# Patient Record
Sex: MALE | Race: BLACK OR AFRICAN AMERICAN | Employment: UNEMPLOYED | URBAN - METROPOLITAN AREA
[De-identification: names, ages, dates, MRNs, and addresses within clinical notes are randomized per-mention and may not be internally consistent; named-entity substitution may affect disease eponyms.]

---

## 2017-09-30 ENCOUNTER — HOSPITAL ENCOUNTER (EMERGENCY)
Age: 37
Discharge: HOME | End: 2017-09-30
Attending: EMERGENCY MEDICINE | Admitting: EMERGENCY MEDICINE
Payer: SELF-PAY

## 2017-09-30 VITALS — WEIGHT: 245 LBS | HEART RATE: 73 BPM | OXYGEN SATURATION: 99 % | RESPIRATION RATE: 16 BRPM | TEMPERATURE: 98.1 F

## 2017-09-30 DIAGNOSIS — J06.9 ACUTE UPPER RESPIRATORY INFECTION: ICD-10-CM

## 2017-09-30 DIAGNOSIS — J02.9 ACUTE PHARYNGITIS, UNSPECIFIED ETIOLOGY: Primary | ICD-10-CM

## 2017-09-30 PROCEDURE — 87081 CULTURE SCREEN ONLY: CPT | Performed by: EMERGENCY MEDICINE

## 2017-09-30 PROCEDURE — 99281 EMR DPT VST MAYX REQ PHY/QHP: CPT

## 2017-09-30 NOTE — ED PROVIDER NOTES
[facility] EMERGENCY DEPT      43 y.o. male with noted past medical history who presents to the emergency department c/o cold-like sx and sore throat x4 days. Pain is worsened by swallowing and is rated 8/10. Associated sx include ear pain and cough. No other complaints. Nursing nurses regarding the HPI and triage nursing notes were reviewed. No current facility-administered medications for this encounter. No current outpatient prescriptions on file. No past medical history on file. No past surgical history on file. No family history on file. Social History     Social History    Marital status: SINGLE     Spouse name: N/A    Number of children: N/A    Years of education: N/A     Occupational History    Not on file. Social History Main Topics    Smoking status: Not on file    Smokeless tobacco: Not on file    Alcohol use Not on file    Drug use: Not on file    Sexual activity: Not on file     Other Topics Concern    Not on file     Social History Narrative       No Known Allergies    Patient's primary care provider (as noted in EPIC):  None    REVIEW OF SYSTEMS:    Constitutional:  Negative for diaphoresis. Eyes:  Negative for diploplia. HENT:  Negative for congestion. Respiratory:  Negative for stridor. Cardiovascular:  Negative for palpitations. Gastrointestinal:  Negative for diarrhea. Genitourinary:  Negative for flank pain. Musculoskeletal:  Negative for back pain. Skin:  Negative for pallor. Neurological:  Negative for weakness. Psychiatric:  Negative for hallucinations. Visit Vitals    Pulse 73    Temp 98.1 °F (36.7 °C)    Resp 16    Wt 111.1 kg (245 lb)    SpO2 99%       PHYSICAL EXAM:    CONSTITUTIONAL:  Alert, in no apparent distress;  well developed;  well nourished. HEAD:  Normocephalic, atraumatic. Sinuses:  No sinus pressure with leaning head forward. No sinus tenderness to percussion. EYES:  EOMI. Non-icteric sclera. Normal conjunctiva. ENTM:  Nose:  no rhinorrhea. Throat:  No tonsillar erythema and exudates, mucous membranes moist.    NECK:  No JVD. Supple  RESPIRATORY:  Chest clear, equal breath sounds, good air movement. CARDIOVASCULAR:  Regular rate and rhythm. No murmurs, rubs, or gallops. GI:  Normal bowel sounds, abdomen soft and non-tender. No rebound or guarding. BACK:  Non-tender. UPPER EXT:  Normal inspection. LOWER EXT:  No edema, no calf tenderness. Distal pulses intact. NEURO:  Moves all four extremities, and grossly normal motor exam.  SKIN:  No rashes;  Normal for age. PSYCH:  Alert and normal affect. DIFFERENTIAL DIAGNOSES/ MEDICAL DECISION MAKING:  Viral pharyngitis, streptococcal pharyngitis, peritonsillar or other oropharyngeal abscesses, result of post nasal drip, hand-foot-mouth disease, aphthous ulcers in oropharynx, and/or a combination of the above and lesser etiologies. Abnormal lab results from this emergency department encounter:  Labs Reviewed   [address]       Lab values for this patient within approximately the last 12 hours:  Recent Results (from the past 12 hour(s))   STREP THROAT SCREEN    Collection Time: 09/30/17  3:54 PM   Result Value Ref Range    Special Requests: NO SPECIAL REQUESTS      Strep Screen NEGATIVE       Culture result: PENDING        Radiologist and cardiologist interpretations if available at time of this note:  No results found. Medication(s) ordered for patient during this emergency visit encounter:  Medications - No data to display    ED COURSE:      ED Course:  Given this, as well as the patients presentation, I believe that the patient has acute VIRAL tonsillopharyngitis. MDM:  There is no airway compromise. No stridor. Patient is safe for discharge home. DIAGNOSIS:  1. Acute pharyngitis. 2.  Acute upper respiratory infection. SPECIFIC PATIENT INSTRUCTIONS FROM THE PHYSICIAN WHO TREATED YOU IN THE ER TODAY:  1.   Return if any concerns or worsening of condition(s). 2.  Use over the counter Chloraseptic and throat lozenges, such as Abbasi's throat lozenges, to help control the throat pain. 3.  Follow up with your primary doctor if not improving in the next 2-3 days. Jelena Gill M.D. Provider Attestation:  If a scribe was utilized in generation of this patient record, I personally performed the services described in the documentation, reviewed the documentation, as recorded by the scribe in my presence, and it accurately records the patient's history of presenting illness, review of systems, patient physical examination, and procedures performed by me as the attending physician. Jelena Gill M.D. [facility] Board Certified Emergency Physician  9/30/2017.  3:55 PM    Scribe Attestation      Thalia Walker acting as a scribe for and in the presence of Bianca Poe MD      September 30, 2017 at 4:18 PM       Provider Attestation:      I personally performed the services described in the documentation, reviewed the documentation, as recorded by the scribe in my presence, and it accurately and completely records my words and actions.  September 30, 2017 at 4:18 PM - Bianca Poe MD

## 2017-09-30 NOTE — DISCHARGE INSTRUCTIONS
SPECIFIC PATIENT INSTRUCTIONS FROM THE PHYSICIAN WHO TREATED YOU IN THE ER TODAY:  1. Return if any concerns or worsening of condition(s). 2.  Use over the counter Chloraseptic and throat lozenges, such as Abbasi's throat lozenges, to help control the throat pain. 3.  Follow up with your primary doctor if not improving in the next 2-3 days. Upper Respiratory Infection (Cold): Care Instructions  Your Care Instructions    An upper respiratory infection, or URI, is an infection of the nose, sinuses, or throat. URIs are spread by coughs, sneezes, and direct contact. The common cold is the most frequent kind of URI. The flu and sinus infections are other kinds of URIs. Almost all URIs are caused by viruses. Antibiotics won't cure them. But you can treat most infections with home care. This may include drinking lots of fluids and taking over-the-counter pain medicine. You will probably feel better in 4 to 10 days. The doctor has checked you carefully, but problems can develop later. If you notice any problems or new symptoms, get medical treatment right away. Follow-up care is a key part of your treatment and safety. Be sure to make and go to all appointments, and call your doctor if you are having problems. It's also a good idea to know your test results and keep a list of the medicines you take. How can you care for yourself at home? · To prevent dehydration, drink plenty of fluids, enough so that your urine is light yellow or clear like water. Choose water and other caffeine-free clear liquids until you feel better. If you have kidney, heart, or liver disease and have to limit fluids, talk with your doctor before you increase the amount of fluids you drink. · Take an over-the-counter pain medicine, such as acetaminophen (Tylenol), ibuprofen (Advil, Motrin), or naproxen (Aleve). Read and follow all instructions on the label. · Before you use cough and cold medicines, check the label.  These medicines may not be safe for young children or for people with certain health problems. · Be careful when taking over-the-counter cold or flu medicines and Tylenol at the same time. Many of these medicines have acetaminophen, which is Tylenol. Read the labels to make sure that you are not taking more than the recommended dose. Too much acetaminophen (Tylenol) can be harmful. · Get plenty of rest.  · Do not smoke or allow others to smoke around you. If you need help quitting, talk to your doctor about stop-smoking programs and medicines. These can increase your chances of quitting for good. When should you call for help? Call 911 anytime you think you may need emergency care. For example, call if:  · You have severe trouble breathing. Call your doctor now or seek immediate medical care if:  · You seem to be getting much sicker. · You have new or worse trouble breathing. · You have a new or higher fever. · You have a new rash. Watch closely for changes in your health, and be sure to contact your doctor if:  · You have a new symptom, such as a sore throat, an earache, or sinus pain. · You cough more deeply or more often, especially if you notice more mucus or a change in the color of your mucus. · You do not get better as expected. Where can you learn more? Go to http://vj-peng.info/. Enter R889 in the search box to learn more about \"Upper Respiratory Infection (Cold): Care Instructions. \"  Current as of: March 25, 2017  Content Version: 11.3  © 1630-2181 [employer]. Care instructions adapted under license by [employer] (which disclaims liability or warranty for this information). If you have questions about a medical condition or this instruction, always ask your healthcare professional. [place] [address] any warranty or liability for your use of this information.            Sore Throat: Care Instructions  Your Care Instructions    Infection by bacteria or a virus causes most sore throats. Cigarette smoke, dry air, air pollution, allergies, and yelling can also cause a sore throat. Sore throats can be painful and annoying. Fortunately, most sore throats go away on their own. If you have a bacterial infection, your doctor may prescribe antibiotics. Follow-up care is a key part of your treatment and safety. Be sure to make and go to all appointments, and call your doctor if you are having problems. It's also a good idea to know your test results and keep a list of the medicines you take. How can you care for yourself at home? · If your doctor prescribed antibiotics, take them as directed. Do not stop taking them just because you feel better. You need to take the full course of antibiotics. · Gargle with warm salt water once an hour to help reduce swelling and relieve discomfort. Use 1 teaspoon of salt mixed in 1 cup of warm water. · Take an over-the-counter pain medicine, such as acetaminophen (Tylenol), ibuprofen (Advil, Motrin), or naproxen (Aleve). Read and follow all instructions on the label. · Be careful when taking over-the-counter cold or flu medicines and Tylenol at the same time. Many of these medicines have acetaminophen, which is Tylenol. Read the labels to make sure that you are not taking more than the recommended dose. Too much acetaminophen (Tylenol) can be harmful. · Drink plenty of fluids. Fluids may help soothe an irritated throat. Hot fluids, such as tea or soup, may help decrease throat pain. · Use over-the-counter throat lozenges to soothe pain. Regular cough drops or hard candy may also help. These should not be given to young children because of the risk of choking. · Do not smoke or allow others to smoke around you. If you need help quitting, talk to your doctor about stop-smoking programs and medicines. These can increase your chances of quitting for good. · Use a vaporizer or humidifier to add moisture to your bedroom. Follow the directions for cleaning the machine. When should you call for help? Call your doctor now or seek immediate medical care if:  · You have new or worse trouble swallowing. · Your sore throat gets much worse on one side. Watch closely for changes in your health, and be sure to contact your doctor if you do not get better as expected. Where can you learn more? Go to http://vj-peng.info/. Enter 062 [id number] in the search box to learn more about \"Sore Throat: Care Instructions. \"  Current as of: July 29, 2016  Content Version: 11.3  © 2906-7806 [employer]. Care instructions adapted under license by [employer] (which disclaims liability or warranty for this information). If you have questions about a medical condition or this instruction, always ask your healthcare professional. [address] any warranty or liability for your use of this information. Rapid Strep Test: About This Test  What is it? A rapid strep test checks the bacteria in your throat to see if strep is the cause of your sore throat. Why is this test done? It may be done so your doctor can find out right away whether you have strep throat. There is another test for strep, called a throat culture, but that test takes a few days to get the results. How can you prepare for the test?  You don't need to do anything before you have this test.  What happens during the test?  · You will be asked to tilt your head back and open your mouth as wide as possible. · Your doctor will press your tongue down with a flat stick (tongue depressor) and then examine your mouth and throat. · A clean cotton swab will be rubbed over the back of your throat, around your tonsils, and over any red areas or sores to collect a sample. How long does the test take? · The test takes less than a minute. · Results are available in 10 to 15 minutes.   Follow-up care is a key part of your treatment and safety. Be sure to make and go to all appointments, and call your doctor if you are having problems. It's also a good idea to keep a list of the medicines you take. Ask your doctor when you can expect to have your test results. Where can you learn more? Go to http://vj-peng.info/. Enter B356 in the search box to learn more about \"Rapid Strep Test: About This Test.\"  Current as of: 2016  Content Version: 11.3  © 4400-0767 [employer]. Care instructions adapted under license by [employer] (which disclaims liability or warranty for this information). If you have questions about a medical condition or this instruction, always ask your healthcare professional. [address] any warranty or liability for your use of this information. [facility] Activation    Thank you for requesting access to [facility]. Please follow the instructions below to securely access and download your online medical record. [facility] allows you to send messages to your doctor, view your test results, renew your prescriptions, schedule appointments, and more. How Do I Sign Up? 1. In your internet browser, go to [email or web address]. 2. Click on the First Time User? Click Here link in the Sign In box. You will see the New Member Sign Up page. 3. Enter your [facility] Access Code exactly as it appears below. You will not need to use this code after youve completed the sign-up process. If you do not sign up before the expiration date, you must request a new code. [facility] Access Code: [email or web address]  Expires: 2017  4:22 PM (This is the date your [facility] access code will )    4. Enter the last four digits of your Social Security Number (xxxx) and Date of Birth (mm/dd/yyyy) as indicated and click Submit. You will be taken to the next sign-up page. 5. Create a [facility] ID.  This will be your [facility] login ID and cannot be changed, so think of one that is secure and easy to remember. 6. Create a [employer] password. You can change your password at any time. 7. Enter your Password Reset Question and Answer. This can be used at a later time if you forget your password. 8. Enter your e-mail address. You will receive e-mail notification when new information is available in [address] 9. Click Sign Up. You can now view and download portions of your medical record. 10. Click the Download Summary menu link to download a portable copy of your medical information. Additional Information    If you have questions, please visit the Frequently Asked Questions section of the [employer] website at [email or web address] Remember, [employer] is NOT to be used for urgent needs. For medical emergencies, dial 911.

## 2017-10-02 LAB
B-HEM STREP THROAT QL CULT: NEGATIVE
BACTERIA SPEC CULT: NORMAL
SERVICE CMNT-IMP: NORMAL

## 2020-12-05 ENCOUNTER — APPOINTMENT (OUTPATIENT)
Dept: GENERAL RADIOLOGY | Age: 40
DRG: 137 | End: 2020-12-05
Attending: PHYSICIAN ASSISTANT
Payer: MEDICAID

## 2020-12-05 ENCOUNTER — HOSPITAL ENCOUNTER (INPATIENT)
Age: 40
LOS: 2 days | Discharge: HOME | DRG: 137 | End: 2020-12-07
Attending: EMERGENCY MEDICINE | Admitting: HOSPITALIST
Payer: MEDICAID

## 2020-12-05 DIAGNOSIS — J96.01 ACUTE RESPIRATORY FAILURE WITH HYPOXIA (HCC): ICD-10-CM

## 2020-12-05 DIAGNOSIS — U07.1 PNEUMONIA DUE TO COVID-19 VIRUS: Primary | ICD-10-CM

## 2020-12-05 DIAGNOSIS — J12.82 PNEUMONIA DUE TO COVID-19 VIRUS: Primary | ICD-10-CM

## 2020-12-05 PROBLEM — J96.00 ACUTE RESPIRATORY FAILURE (HCC): Status: ACTIVE | Noted: 2020-12-05

## 2020-12-05 LAB
ALBUMIN SERPL-MCNC: 3.2 G/DL (ref 3.4–5)
ALBUMIN/GLOB SERPL: 0.8 {RATIO} (ref 0.8–1.7)
ALP SERPL-CCNC: 74 U/L (ref 45–117)
ALT SERPL-CCNC: 94 U/L (ref 16–61)
ANION GAP SERPL CALC-SCNC: 6 MMOL/L (ref 3–18)
APPEARANCE UR: CLEAR
APTT PPP: 33.5 SEC (ref 23–36.4)
AST SERPL-CCNC: 111 U/L (ref 10–38)
BACTERIA URNS QL MICRO: ABNORMAL /HPF
BASOPHILS # BLD: 0.1 K/UL (ref 0–0.1)
BASOPHILS NFR BLD: 1 % (ref 0–2)
BILIRUB SERPL-MCNC: 1.6 MG/DL (ref 0.2–1)
BILIRUB UR QL: ABNORMAL
BUN SERPL-MCNC: 14 MG/DL (ref 7–18)
BUN/CREAT SERPL: 11 (ref 12–20)
CALCIUM SERPL-MCNC: 8.7 MG/DL (ref 8.5–10.1)
CHLORIDE SERPL-SCNC: 100 MMOL/L (ref 100–111)
CO2 SERPL-SCNC: 27 MMOL/L (ref 21–32)
COLOR UR: ABNORMAL
COVID-19 RAPID TEST, COVR: NOT DETECTED
CREAT SERPL-MCNC: 1.28 MG/DL (ref 0.6–1.3)
D DIMER PPP FEU-MCNC: 0.88 UG/ML(FEU)
DIFFERENTIAL METHOD BLD: ABNORMAL
EOSINOPHIL # BLD: 0 K/UL (ref 0–0.4)
EOSINOPHIL NFR BLD: 0 % (ref 0–5)
EPITH CASTS URNS QL MICRO: ABNORMAL /LPF (ref 0–5)
ERYTHROCYTE [DISTWIDTH] IN BLOOD BY AUTOMATED COUNT: 11.3 % (ref 11.6–14.5)
FIBRINOGEN PPP-MCNC: 814 MG/DL (ref 210–451)
GLOBULIN SER CALC-MCNC: 3.9 G/DL (ref 2–4)
GLUCOSE SERPL-MCNC: 102 MG/DL (ref 74–99)
GLUCOSE UR STRIP.AUTO-MCNC: NEGATIVE MG/DL
HCT VFR BLD AUTO: 40.1 % (ref 36–48)
HGB BLD-MCNC: 12.8 G/DL (ref 13–16)
HGB UR QL STRIP: NEGATIVE
INR PPP: 1.1 (ref 0.8–1.2)
KETONES UR QL STRIP.AUTO: NEGATIVE MG/DL
L PNEUMO AG UR QL IA: NEGATIVE
LACTATE BLD-SCNC: 0.81 MMOL/L (ref 0.4–2)
LEUKOCYTE ESTERASE UR QL STRIP.AUTO: ABNORMAL
LYMPHOCYTES # BLD: 1.5 K/UL (ref 0.9–3.6)
LYMPHOCYTES NFR BLD: 12 % (ref 21–52)
MCH RBC QN AUTO: 29.2 PG (ref 24–34)
MCHC RBC AUTO-ENTMCNC: 31.9 G/DL (ref 31–37)
MCV RBC AUTO: 91.6 FL (ref 74–97)
MONOCYTES # BLD: 2.6 K/UL (ref 0.05–1.2)
MONOCYTES NFR BLD: 22 % (ref 3–10)
NEUTS SEG # BLD: 8 K/UL (ref 1.8–8)
NEUTS SEG NFR BLD: 65 % (ref 40–73)
NITRITE UR QL STRIP.AUTO: NEGATIVE
PH UR STRIP: 6 [PH] (ref 5–8)
PLATELET # BLD AUTO: 319 K/UL (ref 135–420)
PMV BLD AUTO: 11.8 FL (ref 9.2–11.8)
POTASSIUM SERPL-SCNC: 4.4 MMOL/L (ref 3.5–5.5)
PROT SERPL-MCNC: 7.1 G/DL (ref 6.4–8.2)
PROT UR STRIP-MCNC: ABNORMAL MG/DL
PROTHROMBIN TIME: 14.2 SEC (ref 11.5–15.2)
RBC # BLD AUTO: 4.38 M/UL (ref 4.7–5.5)
RBC #/AREA URNS HPF: ABNORMAL /HPF (ref 0–5)
S PNEUM AG UR QL: NEGATIVE
SODIUM SERPL-SCNC: 133 MMOL/L (ref 136–145)
SOURCE, COVRS: NORMAL
SP GR UR REFRACTOMETRY: 1.02 (ref 1–1.03)
SPECIMEN TYPE, XMCV1T: NORMAL
UROBILINOGEN UR QL STRIP.AUTO: 1 EU/DL (ref 0.2–1)
WBC # BLD AUTO: 12.1 K/UL (ref 4.6–13.2)
WBC URNS QL MICRO: ABNORMAL /HPF (ref 0–4)

## 2020-12-05 PROCEDURE — 85025 COMPLETE CBC W/AUTO DIFF WBC: CPT

## 2020-12-05 PROCEDURE — 99285 EMERGENCY DEPT VISIT HI MDM: CPT

## 2020-12-05 PROCEDURE — 74011250636 HC RX REV CODE- 250/636: Performed by: PHYSICIAN ASSISTANT

## 2020-12-05 PROCEDURE — 96375 TX/PRO/DX INJ NEW DRUG ADDON: CPT

## 2020-12-05 PROCEDURE — 87635 SARS-COV-2 COVID-19 AMP PRB: CPT

## 2020-12-05 PROCEDURE — 93005 ELECTROCARDIOGRAM TRACING: CPT

## 2020-12-05 PROCEDURE — 74011000258 HC RX REV CODE- 258: Performed by: PHYSICIAN ASSISTANT

## 2020-12-05 PROCEDURE — 74011250636 HC RX REV CODE- 250/636: Performed by: INTERNAL MEDICINE

## 2020-12-05 PROCEDURE — 85730 THROMBOPLASTIN TIME PARTIAL: CPT

## 2020-12-05 PROCEDURE — 85379 FIBRIN DEGRADATION QUANT: CPT

## 2020-12-05 PROCEDURE — 83605 ASSAY OF LACTIC ACID: CPT

## 2020-12-05 PROCEDURE — 77010033678 HC OXYGEN DAILY

## 2020-12-05 PROCEDURE — 87070 CULTURE OTHR SPECIMN AEROBIC: CPT

## 2020-12-05 PROCEDURE — 94762 N-INVAS EAR/PLS OXIMTRY CONT: CPT

## 2020-12-05 PROCEDURE — 80053 COMPREHEN METABOLIC PANEL: CPT

## 2020-12-05 PROCEDURE — 81001 URINALYSIS AUTO W/SCOPE: CPT

## 2020-12-05 PROCEDURE — XW033E5 INTRODUCTION OF REMDESIVIR ANTI-INFECTIVE INTO PERIPHERAL VEIN, PERCUTANEOUS APPROACH, NEW TECHNOLOGY GROUP 5: ICD-10-PCS | Performed by: INTERNAL MEDICINE

## 2020-12-05 PROCEDURE — 87449 NOS EACH ORGANISM AG IA: CPT

## 2020-12-05 PROCEDURE — 96372 THER/PROPH/DIAG INJ SC/IM: CPT

## 2020-12-05 PROCEDURE — 96365 THER/PROPH/DIAG IV INF INIT: CPT

## 2020-12-05 PROCEDURE — 74011000250 HC RX REV CODE- 250: Performed by: INTERNAL MEDICINE

## 2020-12-05 PROCEDURE — 71045 X-RAY EXAM CHEST 1 VIEW: CPT

## 2020-12-05 PROCEDURE — 65660000000 HC RM CCU STEPDOWN

## 2020-12-05 PROCEDURE — 85384 FIBRINOGEN ACTIVITY: CPT

## 2020-12-05 PROCEDURE — 74011000258 HC RX REV CODE- 258: Performed by: INTERNAL MEDICINE

## 2020-12-05 PROCEDURE — 74011250637 HC RX REV CODE- 250/637: Performed by: HOSPITALIST

## 2020-12-05 PROCEDURE — 85610 PROTHROMBIN TIME: CPT

## 2020-12-05 PROCEDURE — 87040 BLOOD CULTURE FOR BACTERIA: CPT

## 2020-12-05 RX ORDER — ENOXAPARIN SODIUM 100 MG/ML
40 INJECTION SUBCUTANEOUS EVERY 12 HOURS
Status: DISCONTINUED | OUTPATIENT
Start: 2020-12-05 | End: 2020-12-07

## 2020-12-05 RX ORDER — HEPARIN SODIUM 5000 [USP'U]/ML
5000 INJECTION, SOLUTION INTRAVENOUS; SUBCUTANEOUS EVERY 8 HOURS
Status: CANCELLED | OUTPATIENT
Start: 2020-12-05

## 2020-12-05 RX ORDER — LANOLIN ALCOHOL/MO/W.PET/CERES
9 CREAM (GRAM) TOPICAL
Status: DISCONTINUED | OUTPATIENT
Start: 2020-12-05 | End: 2020-12-07 | Stop reason: HOSPADM

## 2020-12-05 RX ORDER — IPRATROPIUM BROMIDE AND ALBUTEROL SULFATE 2.5; .5 MG/3ML; MG/3ML
3 SOLUTION RESPIRATORY (INHALATION)
Status: DISCONTINUED | OUTPATIENT
Start: 2020-12-05 | End: 2020-12-07 | Stop reason: HOSPADM

## 2020-12-05 RX ORDER — SODIUM CHLORIDE 0.9 % (FLUSH) 0.9 %
5-40 SYRINGE (ML) INJECTION EVERY 8 HOURS
Status: DISCONTINUED | OUTPATIENT
Start: 2020-12-05 | End: 2020-12-07 | Stop reason: HOSPADM

## 2020-12-05 RX ORDER — SODIUM CHLORIDE 0.9 % (FLUSH) 0.9 %
30 SYRINGE (ML) INJECTION DAILY
Status: DISCONTINUED | OUTPATIENT
Start: 2020-12-05 | End: 2020-12-07 | Stop reason: HOSPADM

## 2020-12-05 RX ORDER — SODIUM CHLORIDE 0.9 % (FLUSH) 0.9 %
5-10 SYRINGE (ML) INJECTION AS NEEDED
Status: DISCONTINUED | OUTPATIENT
Start: 2020-12-05 | End: 2020-12-07 | Stop reason: SDUPTHER

## 2020-12-05 RX ORDER — SODIUM CHLORIDE 0.9 % (FLUSH) 0.9 %
5-40 SYRINGE (ML) INJECTION AS NEEDED
Status: DISCONTINUED | OUTPATIENT
Start: 2020-12-05 | End: 2020-12-07 | Stop reason: HOSPADM

## 2020-12-05 RX ORDER — DEXAMETHASONE SODIUM PHOSPHATE 4 MG/ML
6 INJECTION, SOLUTION INTRA-ARTICULAR; INTRALESIONAL; INTRAMUSCULAR; INTRAVENOUS; SOFT TISSUE EVERY 24 HOURS
Status: DISCONTINUED | OUTPATIENT
Start: 2020-12-05 | End: 2020-12-07 | Stop reason: HOSPADM

## 2020-12-05 RX ADMIN — Medication 10 ML: at 22:00

## 2020-12-05 RX ADMIN — AZITHROMYCIN MONOHYDRATE 500 MG: 500 INJECTION, POWDER, LYOPHILIZED, FOR SOLUTION INTRAVENOUS at 13:48

## 2020-12-05 RX ADMIN — DEXAMETHASONE SODIUM PHOSPHATE 6 MG: 4 INJECTION, SOLUTION INTRAMUSCULAR; INTRAVENOUS at 17:46

## 2020-12-05 RX ADMIN — CEFTRIAXONE 2 G: 2 INJECTION, POWDER, FOR SOLUTION INTRAMUSCULAR; INTRAVENOUS at 13:17

## 2020-12-05 RX ADMIN — ENOXAPARIN SODIUM 40 MG: 40 INJECTION SUBCUTANEOUS at 16:33

## 2020-12-05 RX ADMIN — REMDESIVIR 200 MG: 100 INJECTION, POWDER, LYOPHILIZED, FOR SOLUTION INTRAVENOUS at 17:49

## 2020-12-05 RX ADMIN — Medication 9 MG: at 22:28

## 2020-12-05 NOTE — ED PROVIDER NOTES
EMERGENCY DEPARTMENT HISTORY AND PHYSICAL EXAM      Date: 12/5/2020  Patient Name: Sallie Tan    History of Presenting Illness     Chief Complaint   Patient presents with    Shortness of Breath    Cough       History Provided By: Patient    HPI: Sallie Tan, 41 y.o. male no significant PMHx presents ambulatory to the ED with cc of productive cough x 1 week. Pt recently seen at [facility] and dx with COVID. Pt reports worsening SOB over the past few days. Denies hx asthma, COPD, DM. Nonsmoker. Pt has been using inhaler without relief of sx. Denies fever/chills, abd pain, vomiting, diarrhea, chest pain, dizziness. Denies using O2 at home. There are no other complaints, changes, or physical findings at this time. PCP: [facility] [place]    No current facility-administered medications on file prior to encounter. No current outpatient medications on file prior to encounter. Past History     Past Medical History:  No past medical history on file. Past Surgical History:  No past surgical history on file. Family History:  No family history on file. Social History:  Social History     Tobacco Use    Smoking status: Not on file   Substance Use Topics    Alcohol use: Not on file    Drug use: Not on file       Allergies:  No Known Allergies      Review of Systems   Review of Systems   Constitutional: Negative for chills and fever. HENT: Negative for facial swelling. Eyes: Negative for photophobia and visual disturbance. Respiratory: Positive for cough and shortness of breath. Cardiovascular: Negative for chest pain. Gastrointestinal: Negative for abdominal pain, nausea and vomiting. Genitourinary: Negative for flank pain. Skin: Negative for color change, pallor, rash and wound. Neurological: Negative for dizziness, weakness, light-headedness and headaches. All other systems reviewed and are negative. Physical Exam   Physical Exam  Vitals signs and nursing note reviewed. Constitutional:       General: He is not in acute distress. Appearance: He is well-developed. Comments: Pt in NAD   HENT:      Head: Normocephalic and atraumatic. Eyes:      Conjunctiva/sclera: Conjunctivae normal.   Cardiovascular:      Rate and Rhythm: Normal rate and regular rhythm. Heart sounds: Normal heart sounds. Pulmonary:      Effort: Tachypnea present. No respiratory distress. Breath sounds: Normal breath sounds. Comments: Lungs CTA  No intercostal retractions  Abdominal:      General: Bowel sounds are normal.      Palpations: Abdomen is soft. Tenderness: There is no abdominal tenderness. Comments: Abdomen soft, nondistended  No guarding or rigidity   Musculoskeletal: Normal range of motion. Skin:     General: Skin is warm. Findings: No rash. Neurological:      Mental Status: He is alert and oriented to person, place, and time. Cranial Nerves: No cranial nerve deficit. Psychiatric:         Behavior: Behavior normal.         Diagnostic Study Results     Labs -     Recent Results (from the past 12 hour(s))   METABOLIC PANEL, COMPREHENSIVE    Collection Time: 12/05/20 11:25 AM   Result Value Ref Range    Sodium 133 (L) 136 - 145 mmol/L    Potassium 4.4 3.5 - 5.5 mmol/L    Chloride 100 100 - 111 mmol/L    CO2 27 21 - 32 mmol/L    Anion gap 6 3.0 - 18 mmol/L    Glucose 102 (H) 74 - 99 mg/dL    BUN 14 7.0 - 18 MG/DL    Creatinine 1.28 0.6 - 1.3 MG/DL    BUN/Creatinine ratio 11 (L) 12 - 20      GFR est AA >60 >60 ml/min/1.73m2    GFR est non-AA >60 >60 ml/min/1.73m2    Calcium 8.7 8.5 - 10.1 MG/DL    Bilirubin, total 1.6 (H) 0.2 - 1.0 MG/DL    ALT (SGPT) 94 (H) 16 - 61 U/L    AST (SGOT) 111 (H) 10 - 38 U/L    Alk.  phosphatase 74 45 - 117 U/L    Protein, total 7.1 6.4 - 8.2 g/dL    Albumin 3.2 (L) 3.4 - 5.0 g/dL    Globulin 3.9 2.0 - 4.0 g/dL    A-G Ratio 0.8 0.8 - 1.7     CBC WITH AUTOMATED DIFF    Collection Time: 12/05/20 11:25 AM   Result Value Ref Range WBC 12.1 4.6 - 13.2 K/uL    RBC 4.38 (L) 4.70 - 5.50 M/uL    HGB 12.8 (L) 13.0 - 16.0 g/dL    HCT 40.1 36.0 - 48.0 %    MCV 91.6 74.0 - 97.0 FL    MCH 29.2 24.0 - 34.0 PG    MCHC 31.9 31.0 - 37.0 g/dL    RDW 11.3 (L) 11.6 - 14.5 %    PLATELET 246 782 - 268 K/uL    MPV 11.8 9.2 - 11.8 FL    NEUTROPHILS 65 40 - 73 %    LYMPHOCYTES 12 (L) 21 - 52 %    MONOCYTES 22 (H) 3 - 10 %    EOSINOPHILS 0 0 - 5 %    BASOPHILS 1 0 - 2 %    ABS. NEUTROPHILS 8.0 1.8 - 8.0 K/UL    ABS. LYMPHOCYTES 1.5 0.9 - 3.6 K/UL    ABS. MONOCYTES 2.6 (H) 0.05 - 1.2 K/UL    ABS. EOSINOPHILS 0.0 0.0 - 0.4 K/UL    ABS.  BASOPHILS 0.1 0.0 - 0.1 K/UL    DF AUTOMATED     PROTHROMBIN TIME + INR    Collection Time: 12/05/20 11:25 AM   Result Value Ref Range    Prothrombin time 14.2 11.5 - 15.2 sec    INR 1.1 0.8 - 1.2     PTT    Collection Time: 12/05/20 11:25 AM   Result Value Ref Range    aPTT 33.5 23.0 - 36.4 SEC   D DIMER    Collection Time: 12/05/20 11:25 AM   Result Value Ref Range    D DIMER 0.88 (H) <0.46 ug/ml(FEU)   FIBRINOGEN    Collection Time: 12/05/20 11:25 AM   Result Value Ref Range    Fibrinogen 814 (H) 210 - 451 mg/dL   POC LACTIC ACID    Collection Time: 12/05/20 11:32 AM   Result Value Ref Range    Lactic Acid (POC) 0.81 0.40 - 2.00 mmol/L   EKG, 12 LEAD, INITIAL    Collection Time: 12/05/20 11:50 AM   Result Value Ref Range    Ventricular Rate 91 BPM    Atrial Rate 91 BPM    P-R Interval 126 ms    QRS Duration 86 ms    Q-T Interval 370 ms    QTC Calculation (Bezet) 455 ms    Calculated P Axis 55 degrees    Calculated R Axis 33 degrees    Calculated T Axis 17 degrees    Diagnosis       Normal sinus rhythm  Nonspecific T wave abnormality  Abnormal ECG  No previous ECGs available     URINALYSIS W/ RFLX MICROSCOPIC    Collection Time: 12/05/20 12:00 PM   Result Value Ref Range    Color DARK YELLOW      Appearance CLEAR      Specific gravity 1.018 1.005 - 1.030      pH (UA) 6.0 5.0 - 8.0      Protein TRACE (A) NEG mg/dL    Glucose Negative NEG mg/dL    Ketone Negative NEG mg/dL    Bilirubin SMALL (A) NEG      Blood Negative NEG      Urobilinogen 1.0 0.2 - 1.0 EU/dL    Nitrites Negative NEG      Leukocyte Esterase TRACE (A) NEG     URINE MICROSCOPIC ONLY    Collection Time: 12/05/20 12:00 PM   Result Value Ref Range    WBC 2 to 5 0 - 4 /hpf    RBC 0 to 3 0 - 5 /hpf    Epithelial cells FEW 0 - 5 /lpf    Bacteria 1+ (A) NEG /hpf   SARS-COV-2    Collection Time: 12/05/20  1:13 PM   Result Value Ref Range    Specimen source Nasopharyngeal      COVID-19 rapid test Not detected NOTD      Specimen type NP Swab         Radiologic Studies -   XR CHEST PORT   Final Result   IMPRESSION:         1. Bilateral patchy infiltrates. Appearance could be seen with Covid pneumonia. CT Results  (Last 48 hours)    None        CXR Results  (Last 48 hours)               12/05/20 1125  XR CHEST PORT Final result    Impression:  IMPRESSION:           1. Bilateral patchy infiltrates. Appearance could be seen with Covid pneumonia. Narrative:  EXAM: CHEST RADIOGRAPH, SINGLE VIEW       CLINICAL INDICATION/HISTORY: Short of breath. Cough       COMPARISON: None. TECHNIQUE: Portable frontal view of the chest was obtained.        _______________       FINDINGS:       SUPPORT DEVICES: None. HEART AND MEDIASTINUM: Cardiomediastinal silhouette appears within normal   limits. Normal caliber thoracic aorta. No central vascular congestion. LUNGS AND PLEURAL SPACES: Bilateral patchy infiltrates are present. No pleural   effusion or pneumothorax. BONY THORAX AND SOFT TISSUES: No acute osseous abnormality. _______________                 Medical Decision Making   I am the first provider for this patient. I reviewed the vital signs, available nursing notes, past medical history, past surgical history, family history and social history. Vital Signs-Reviewed the patient's vital signs.   Patient Vitals for the past 12 hrs:   Temp Pulse Resp BP SpO2   12/05/20 1500 -- 96 20 122/72 95 %   12/05/20 1400 -- 96 (!) 36 111/79 96 %   12/05/20 1337 -- 96 30 116/74 94 %   12/05/20 1245 -- 89 29 -- 92 %   12/05/20 1230 -- 89 (!) 32 116/74 93 %   12/05/20 1215 -- 86 25 115/68 91 %   12/05/20 1205 -- 98 29 104/78 97 %   12/05/20 1200 -- (!) 116 (!) 34 -- 98 %   12/05/20 1145 -- 92 30 -- 95 %   12/05/20 1130 -- 97 25 -- 93 %   12/05/20 1115 -- 96 21 113/79 94 %   12/05/20 1100 -- 99 (!) 31 115/80 95 %   12/05/20 1036 97.2 °F (36.2 °C) (!) 104 20 (!) 131/94 94 %         EKG interpretation: (Preliminary)  Rhythm: normal sinus rhythm; and regular . Rate (approx.): 91; Axis: normal; IL interval: normal; QRS interval: normal ; ST/T wave: T wave inverted. No acute ischemic changes. Records Reviewed: Nursing Notes and Old Medical Records    Provider Notes (Medical Decision Making):   DDx: COVID, PNA, URI, Sepsis, Respiratory failure    40 yo M who presents with productive cough with worsening SOB over the past week. Recently dx with COVID. Meets sepsis criteria. EKG shows no acute ischemic changes. No leukocytosis. CXR shows possible PNA or COVID. Treated with azithro and rocephin in ED. Pt desat's to 90% while talking, and not on O2 at home. Pt admitted for further management. ED Course:   Initial assessment performed. The patients presenting problems have been discussed, and they are in agreement with the care plan formulated and outlined with them. I have encouraged them to ask questions as they arise throughout their visit. Chart review:   Se[facility] on 11/[facility] at [facility] - positive for COVID      Holding fluids based on possible COVID dx and concern for fluid overload. Pt is in agreement. While talking to pt, SpO2 dropped to 91%. Remained 90% - 91% while talking. Pt tachypnic while talking. Placed on 2L O2 and SpO2 now 97%. Reassessed pt for fluids. Will continue to hold fluids. Pt is in agreement.         Spoke with Dr. Emily Daniel, consult for hospitalist. Dicussed pt's recent COVID dx, oxygen desaturation, CXR and worsening sx. Discussed covered for sepsis and       Disposition:  Admitted    PLAN:  1. There are no discharge medications for this patient. 2.   Follow-up Information    None       Return to ED if worse     Diagnosis     Clinical Impression:   1. Pneumonia due to COVID-19 virus    2. Acute respiratory failure with hypoxia (HCC)        Attestations:    BI Hercules    Please note that this dictation was completed with Conject, the computer voice recognition software. Quite often unanticipated grammatical, syntax, homophones, and other interpretive errors are inadvertently transcribed by the computer software. Please disregard these errors. Please excuse any errors that have escaped final proofreading. Thank you.

## 2020-12-05 NOTE — ED NOTES
Pt reports cough and SOB since Thanksgiving. Pt states he had a positive COVID test on Tuesday.   Pt reports increased SOB especially when talking and walking

## 2020-12-05 NOTE — PROGRESS NOTES
Pharmacy Services - Remdesivir      Completed chart review for [facility]. Patient meets all inclusion criteria for use and zero exclusion criteria for remdesivir per ministry guidelines outlined below. ID consulted and approved. Inclusion (must meet both criteria)  Adults, children (?3.5Kg, only use lyophilized powder), or pregnant women with proven COVID-19 as defined by a positive nasopharyngeal swab, tracheal aspirate or sputum SARS-CoV-2 PCR or a positive serology test requiring hospitalization for COVID-19-severe pneumonia. Patients should not be hospitalized solely with the intent of receiving remdesivir without meeting clinical criteria requiring hospitalization. Requiring supplemental oxygen*      Exclusion Criteria (Patients who would not be eligible for RDV therapy) - Present at initiation of therapy :  Requiring invasive or non-invasive mechanical ventilation**   Consider use in patients requiring high-flow oxygen early in the disease state (based on symptom duration)   Use of more than 1 vasopressor agent prior to start of remdesivir   Already improving on current treatment/supportive regimen as evidenced by improving oxygenation, and/or impending discharge   Patients in whom the clinical team think death is in the immediate short-term whereby administration of RDV unlikely to change clinical outcome       Other considerations:  Prioritize patients who present with symptom onset < 14 days and within 10 days of acute care admission  Special populations:  GFR < 30mL/min (Cockcroft-Gault formula for patients ? 18 years or [address] [place] [address] for patients < 25years of age) : Use with caution due to risk of cyclodextrin toxicity with IV solution as it accumulates in reduced renal clearance   If therapy is stopped and later restarted, there is no need to reload patient, dosing should continue with dose where it was stopped to complete the 5-day course.    If a patient has clinically improved and is ready for discharge, completion of remdesivir course should not be the sole reason for continued admission     Dosing Regimen and Duration of Therapy:  200mg on day 1, followed by 100mg daily x 4 days (5 days is max treatment duration) =  (6 vials)  IV infusion over 30 minutes  0.9% Sodium Chloride 30mL IV flush daily to follow each remdesivir dose x 5 days    Monitoring Parameters:   CMP daily x 5 days   Stop remdesivir if LFTs substantially increase following initiation of remdesivir (ie: 5x baseline lab values at admission or most recent available LFT lab values)   CBC daily x 5 days        Please contact pharmacy directly with any questions. Thank you. Dexamethasone and Enoxaparin ordered per protocol, the later pending labs.       [facility] [address]  Clinical Pharmacist  [phone]

## 2020-12-05 NOTE — H&P
Internal Medicine History and Physical  Note           NAME:  Shawnie Lombard   :   1980   MRN:  [id number]     PCP:  Renea Ellington     Date/Time:  2020 2:43 PM      I hereby certify this patient for admission based upon medical necessity as noted below:        Assessment / plan :        Principal Problem:    Pneumonia due to COVID-19 virus (2020)    Active Problems:    Acute respiratory failure ([address] (2020)         # COVID  Pneumonia. Iv Antibiotic zithro and rocephin. Urine Ag test for pneumonia. Maintain o2. Remidisivr  , Decadron per protocol , pharmacy consulted. Conv plasma not ordered , pt tested negative for COVID and repeat in process. ID consulted. Lovenox BID    # Acute respiratory failure with Hypoxia . Maintain O2   Currently on 2 LNC    #Obesity  Body mass index is 36.26 kg/m². # Continue home regimen / Medications when appropriate. -DVT prophylaxis : Lovenox   - Code Status : FULL    -Other chronic medical problems as per past history. Further management depend on the course of the case and expanded data base. DISPO - pt to be admitted at this time for reasons addressed above, continued hospitalization for ongoing assessment and treatment indicated        Subjective:     CHIEF COMPLAINT:  SOB and cough since thanks giving     HISTORY OF PRESENT ILLNESS:     Mr. Denia Gray is a 41 y.o.  male who is admitted for COVID pneumonia . Mr. Denia Gray with past medical history as noted below , presented to the Emergency Department today  complaining of above. Triage and ER notes noted. ER MD wanted to admit the patient to the hospital for further management and called hospitalist to facilitate this process. patient tested positive for COVID at [facility] 3 days ago , presented with worsening SOB, cough with mucous phlegm production. He has no DM , not a smoker and no known immuno comprised condition.  At our ER, he tested COVID test negative , rapid one. CXR reported with bilateral infiltrate. Patient was coughing and bringing phlegm , whitish mucous when I asked him to take deep breath. No past medical history on file. No past surgical history on file. Social History     Tobacco Use    Smoking status: Not on file   Substance Use Topics    Alcohol use: Not on file        No family history on file.      No Known Allergies     Prior to Admission medications    Not on File       Review of Systems:  (bold if positive, otherwise negative), apart from what mentioned in HPI  Apart from above patient deny any other significant complain  Gen:  Weight gain, weight loss, fever, chills, fatigue  Eyes:  Visual changes, pain, conjunctivitis  ENT:  Sore throat, rhinorrhea, decreased hearing  CVS:  Palpitations, chest pain, dizziness, syncope, edema, PND  Pulm:  Cough, dyspnea, sputum, hemoptysis, wheezing  GI:  Abdominal pain, nausea, emesis, diarrhea, constipation, GERD, melena  :  Hematuria, incontinence, nocturia, dysuria, discharge  MS:  Pain, weakness, swelling, arthritis  Skin:  Rash, erythema, abscess, wound, moles  Endo:  Heat intolerance, cold intolerance, weight gain, polyuria  Hem:  Enlarged nodes, bruising, bleeding, night sweats  Renal:  Edema, change in urine, flank pain  Neuro:  Numbness, tingling, weakness, seizure, headache, tremors       VITALS:    Vital signs reviewed; most recent are:    Visit Vitals  /79   Pulse 96   Temp 97.2 °F (36.2 °C)   Resp (!) 36   Ht 5' 11\" (1.803 m)   Wt 117.9 kg (260 lb)   SpO2 96%   BMI 36.26 kg/m²     SpO2 Readings from Last 6 Encounters:   12/05/20 96%   09/30/17 99%            Intake/Output Summary (Last 24 hours) at 12/5/2020 1521  Last data filed at 12/5/2020 1347  Gross per 24 hour   Intake 50 ml   Output --   Net 50 ml        Physical Exam:     Gen:  Appear stated age, Well-developed,  in no acute distress BUT COUGHING   HEENT:  Head atraumatic, normocephalic , hearing intact to voice, moist mucous membranes. Neck:  Trachea midline , No apparent JVD, Supple,  thyroid non-tender  Resp:  No accessory muscle use,Bilateral BS present, clear breath sounds without wheezes rales or rhonchi  Card:  No murmurs, normal S1, S2 without Gallop . No Significant lower leg peripheral edema. Abd:  Soft, non-tender, non-distended, bowel sounds are present . Musc:  No cyanosis or clubbing. Skin: Warm and dry . No rashes or ulcers, skin turgor is good. Neuro:   no clear area of focal motor weakness, follows commands appropriately. alert. Labs: All Cardiac Markers in the last 24 hours: No results found for: CPK, CK, CKMMB, CKMB, RCK3, CKMBT, CKNDX, CKND1, CONNOR, TROPT, TROIQ, JUAQUIN, TROPT, TNIPOC, BNP, BNPP    Recent Labs     12/05/20  1125   WBC 12.1   HGB 12.8*   HCT 40.1        Recent Labs     12/05/20  1125   *   K 4.4      CO2 27   *   BUN 14   CREA 1.28   CA 8.7   ALB 3.2*   TBILI 1.6*   ALT 94*     No results found for: GLUCPOC  No results for input(s): PH, PCO2, PO2, HCO3, FIO2 in the last 72 hours. No results for input(s): INR, INREXT, INREXT in the last 72 hours. Xr Chest Port    Result Date: 12/5/2020  IMPRESSION: 1. Bilateral patchy infiltrates. Appearance could be seen with Covid pneumonia. Please refer to radiology reports. Telemetry reviewed:   normal sinus rhythm    Risk of deterioration: high      Total time spent in the care of this patient: [phone] Kelley discussed with: Patient, Nursing Staff, Consultant/Specialist, >50% of time spent in counseling and coordination of care and ER MD      Discussed:  Care Plan       I have personally reviewed all pertinent labs, films and EKGs that have officially resulted. I reviewed available pertaining electronic documentation outlining the initial presentation as well as the emergency room physician's encounter.     This document in whole or part of it has been produced using voice recognition software. Unrecognized errors in transcription may be present.     Attending Physician: Gordon Apple MD

## 2020-12-06 LAB
ALBUMIN SERPL-MCNC: 3 G/DL (ref 3.4–5)
ALBUMIN/GLOB SERPL: 0.8 {RATIO} (ref 0.8–1.7)
ALP SERPL-CCNC: 73 U/L (ref 45–117)
ALT SERPL-CCNC: 90 U/L (ref 16–61)
ANION GAP SERPL CALC-SCNC: 6 MMOL/L (ref 3–18)
APTT PPP: 28.4 SEC (ref 23–36.4)
AST SERPL-CCNC: 70 U/L (ref 10–38)
BASOPHILS # BLD: 0.1 K/UL (ref 0–0.1)
BASOPHILS NFR BLD: 1 % (ref 0–2)
BILIRUB SERPL-MCNC: 1.3 MG/DL (ref 0.2–1)
BUN SERPL-MCNC: 14 MG/DL (ref 7–18)
BUN/CREAT SERPL: 13 (ref 12–20)
CALCIUM SERPL-MCNC: 8.6 MG/DL (ref 8.5–10.1)
CHLORIDE SERPL-SCNC: 101 MMOL/L (ref 100–111)
CO2 SERPL-SCNC: 27 MMOL/L (ref 21–32)
CREAT SERPL-MCNC: 1.1 MG/DL (ref 0.6–1.3)
D DIMER PPP FEU-MCNC: 0.75 UG/ML(FEU)
DIFFERENTIAL METHOD BLD: ABNORMAL
EOSINOPHIL # BLD: 0 K/UL (ref 0–0.4)
EOSINOPHIL NFR BLD: 0 % (ref 0–5)
ERYTHROCYTE [DISTWIDTH] IN BLOOD BY AUTOMATED COUNT: 11.4 % (ref 11.6–14.5)
FIBRINOGEN PPP-MCNC: 772 MG/DL (ref 210–451)
GLOBULIN SER CALC-MCNC: 3.9 G/DL (ref 2–4)
GLUCOSE SERPL-MCNC: 138 MG/DL (ref 74–99)
HCT VFR BLD AUTO: 38.4 % (ref 36–48)
HGB BLD-MCNC: 12 G/DL (ref 13–16)
INR PPP: 1.1 (ref 0.8–1.2)
LYMPHOCYTES # BLD: 1 K/UL (ref 0.9–3.6)
LYMPHOCYTES NFR BLD: 12 % (ref 21–52)
MAGNESIUM SERPL-MCNC: 2.8 MG/DL (ref 1.6–2.6)
MCH RBC QN AUTO: 29 PG (ref 24–34)
MCHC RBC AUTO-ENTMCNC: 31.3 G/DL (ref 31–37)
MCV RBC AUTO: 92.8 FL (ref 74–97)
MONOCYTES # BLD: 1.6 K/UL (ref 0.05–1.2)
MONOCYTES NFR BLD: 18 % (ref 3–10)
NEUTS SEG # BLD: 6.3 K/UL (ref 1.8–8)
NEUTS SEG NFR BLD: 69 % (ref 40–73)
PHOSPHATE SERPL-MCNC: 3.9 MG/DL (ref 2.5–4.9)
PLATELET # BLD AUTO: 364 K/UL (ref 135–420)
PMV BLD AUTO: 11.6 FL (ref 9.2–11.8)
POTASSIUM SERPL-SCNC: 4.7 MMOL/L (ref 3.5–5.5)
PROT SERPL-MCNC: 6.9 G/DL (ref 6.4–8.2)
PROTHROMBIN TIME: 13.9 SEC (ref 11.5–15.2)
RBC # BLD AUTO: 4.14 M/UL (ref 4.7–5.5)
SODIUM SERPL-SCNC: 134 MMOL/L (ref 136–145)
WBC # BLD AUTO: 9 K/UL (ref 4.6–13.2)

## 2020-12-06 PROCEDURE — 85384 FIBRINOGEN ACTIVITY: CPT

## 2020-12-06 PROCEDURE — 77010033678 HC OXYGEN DAILY

## 2020-12-06 PROCEDURE — 74011250637 HC RX REV CODE- 250/637: Performed by: HOSPITALIST

## 2020-12-06 PROCEDURE — 85025 COMPLETE CBC W/AUTO DIFF WBC: CPT

## 2020-12-06 PROCEDURE — 85610 PROTHROMBIN TIME: CPT

## 2020-12-06 PROCEDURE — 74011000258 HC RX REV CODE- 258: Performed by: INTERNAL MEDICINE

## 2020-12-06 PROCEDURE — 65660000000 HC RM CCU STEPDOWN

## 2020-12-06 PROCEDURE — 85730 THROMBOPLASTIN TIME PARTIAL: CPT

## 2020-12-06 PROCEDURE — 74011000250 HC RX REV CODE- 250: Performed by: INTERNAL MEDICINE

## 2020-12-06 PROCEDURE — 94762 N-INVAS EAR/PLS OXIMTRY CONT: CPT

## 2020-12-06 PROCEDURE — 80053 COMPREHEN METABOLIC PANEL: CPT

## 2020-12-06 PROCEDURE — 85379 FIBRIN DEGRADATION QUANT: CPT

## 2020-12-06 PROCEDURE — 74011250636 HC RX REV CODE- 250/636: Performed by: HOSPITALIST

## 2020-12-06 PROCEDURE — 83735 ASSAY OF MAGNESIUM: CPT

## 2020-12-06 PROCEDURE — 84100 ASSAY OF PHOSPHORUS: CPT

## 2020-12-06 PROCEDURE — 74011250636 HC RX REV CODE- 250/636: Performed by: INTERNAL MEDICINE

## 2020-12-06 RX ADMIN — REMDESIVIR 100 MG: 100 INJECTION, POWDER, LYOPHILIZED, FOR SOLUTION INTRAVENOUS at 18:12

## 2020-12-06 RX ADMIN — Medication 9 MG: at 22:45

## 2020-12-06 RX ADMIN — Medication 30 ML: at 18:16

## 2020-12-06 RX ADMIN — DEXAMETHASONE SODIUM PHOSPHATE 6 MG: 4 INJECTION, SOLUTION INTRAMUSCULAR; INTRAVENOUS at 17:59

## 2020-12-06 RX ADMIN — Medication 10 ML: at 22:46

## 2020-12-06 RX ADMIN — ENOXAPARIN SODIUM 40 MG: 40 INJECTION SUBCUTANEOUS at 04:56

## 2020-12-06 RX ADMIN — ENOXAPARIN SODIUM 40 MG: 40 INJECTION SUBCUTANEOUS at 16:52

## 2020-12-06 RX ADMIN — CEFTRIAXONE 2 G: 2 INJECTION, POWDER, FOR SOLUTION INTRAMUSCULAR; INTRAVENOUS at 14:47

## 2020-12-06 RX ADMIN — AZITHROMYCIN MONOHYDRATE 500 MG: 500 INJECTION, POWDER, LYOPHILIZED, FOR SOLUTION INTRAVENOUS at 16:30

## 2020-12-06 RX ADMIN — Medication 10 ML: at 16:43

## 2020-12-06 NOTE — ED NOTES
TRANSFER - ED to INPATIENT REPORT:    Verbal report given to Cecy(name) on [facility]  being transferred to 2700(unit) for routine progression of care       Report consisted of patients Situation, Background, Assessment and   Recommendations(SBAR). SBAR report made available to receiving floor on this patient being transferred to  [phone] /2800)  for routine progression of care       Admitting diagnosis Pneumonia due to COVID-19 virus [U07.1, J12.89]  Pneumonia due to COVID-19 virus [U07.1, J12.89]    Information from the following report(s) SBAR was made available to receiving floor. Lines:   Peripheral IV 12/05/20 Left Antecubital (Active)   Site Assessment Clean, dry, & intact 12/05/20 1132   Phlebitis Assessment 0 12/05/20 1132   Infiltration Assessment 0 12/05/20 1132   Dressing Status Clean, dry, & intact 12/05/20 1132   Hub Color/Line Status Green 12/05/20 1132            Medication list confirmed with patient    Opportunity for questions and clarification was provided.       Patient is oriented to time, place, person and situation Isolation precautions for COVID 19 RULE OUT  Patient is  continent and ambulatory without assist     Valuables transported with patient     Patient transported with:   Monitor  Registered Nurse    MAP (Monitor): 71 =Monitored (most recent)  Vitals w/ MEWS Score (last day)     Date/Time MEWS Score Pulse Resp Temp BP Level of Consciousness SpO2    12/06/20 0830  --  100  --  --  (!) 107/59  --  92 %    12/06/20 0800  --  95  --  --  124/80  --  97 %    12/06/20 0430  --  83  --  --  133/86  --  --    12/06/20 0400  --  80  --  --  105/75  --  94 %    12/06/20 0300  --  85  --  --  102/77  --  92 %    12/06/20 0230  --  87  --  --  118/74  --  91 %    12/06/20 0200  --  --  --  --  127/80  --  92 %    12/06/20 0130  --  --  --  --  111/70  --  96 %    12/06/20 0100  --  --  --  --  127/76  --  99 %    12/06/20 0030  --  --  --  --  112/73  --  94 %    12/05/20 5630  -- 90  --  --  110/69  --  96 %    12/05/20 2300  --  94  --  --  110/67  --  95 %    12/05/20 2230  --  92  --  --  116/75  --  95 %    12/05/20 2200  --  97  --  --  120/77  --  94 %    12/05/20 2100  --  99  --  --  107/71  --  94 %    12/05/20 2045  --  96  --  --  (!) 104/53  --  95 %    12/05/20 2015  --  (!) 107  --  --  108/65  --  94 %    12/05/20 2000  --  98  --  --  (!) 106/54  --  93 %    12/05/20 1930  --  (!) 107  --  --  116/72  --  94 %    12/05/20 1900  --  96  --  --  117/73  --  94 %    12/05/20 1830  --  --  --  --  112/80  --  98 %    12/05/20 1700  --  97  --  --  103/64  --  93 %    12/05/20 1500  --  96  20  --  122/72  --  95 %    12/05/20 1400  --  96  (!) 36  --  111/79  --  96 %    12/05/20 1337  --  96  30  --  116/74  --  94 %    12/05/20 1245  --  89  29  --  --  --  92 %    12/05/20 1230  --  89  (!) 32  --  116/74  --  93 %    12/05/20 1215  --  86  25  --  115/68  --  91 %    12/05/20 1205  --  98  29  --  104/78  --  97 %    12/05/20 1200  --  (!) 116  (!) 34  --  --  --  98 %    12/05/20 1145  --  92  30  --  --  --  95 %    12/05/20 1130  --  97  25  --  --  --  93 %    12/05/20 1115  --  96  21  --  113/79  --  94 %    12/05/20 1100  --  99  (!) 31  --  115/80  --  95 %    12/05/20 1036  2  (!) 104  20  97.2 °F (36.2 °C)  (!) 131/94  Alert  94 %              Septic Patients:     Lactic Acid  Lab Results   Component Value Date    LACPO 0.81 12/05/2020    (Most recent on top)

## 2020-12-06 NOTE — ED NOTES
Patient update given to patient. Patient given water and side bed table. Patient stated that he was comfortable and appreciated the update.

## 2020-12-06 NOTE — ED NOTES
Patient  Talking on phone- offered warm blanket patient states that he is comfortable at this time. Patient updated that per supervisor there were no beds available at this time. Patient stated that he understood.

## 2020-12-06 NOTE — ED NOTES
Patient given fresh iced water- updated on status - continue to wait for bed assignment. Patient given Melatonin 9 mg po per orders to assist with sleep. Patient denies any acute distress at this time.

## 2020-12-06 NOTE — PROGRESS NOTES
Problem: Discharge Planning  Goal: *Discharge to safe environment  Outcome: Progressing Towards Goal  Plan is home    Reason for Admission:   Covid +, Now with respiratory distress                   RUR Score:  None, still in ER                   Plan for utilizing home health: should not need        PCP: [facility]  First and Last name:  Name of Adi [facility]    [phone]   Are you a current patient: Yes/No:   Yes   Approximate date of last visit:    Can you participate in a virtual visit with your PCP:  yes                    Current Advanced Directive/Advance Care Plan: See APC note                         Transition of Care Plan: Chart reviewed. Pt verified demographics. His address is his dad's, but pt had been staying with him lately. Now, He has just started to live at his own place with his legal wife again. ( They had a break for a short time). His phone number is [phone] . The phone number listed is his dad. He is unemployed, he has been trying to get medicaid but unsure how far he got. First he had been at [facility], then he was going to [facility] and they had talked to him about it. But the application was not completed per him. He says they say he does have covid, now saying he does not have covid but had Pneumonia . He says he is not sure what is going on. His plan is to be home at [place]. ARETHA is legal Wife Virginie Mendez. Next is Tejal Thakkar [phone]/ cell [phone]. Of note: \"Reshma\" is pt's baby Momma- not a relative or contact.     Care Management Interventions  PCP Verified by CM: No  Palliative Care Criteria Met (RRAT>21 & CHF Dx)?: No  Transition of Care Consult (CM Consult): Discharge Planning  Physical Therapy Consult: No  Occupational Therapy Consult: No  Speech Therapy Consult: No  Current Support Network: Relative's Home(Lives with Dad, wife lives separate)  Confirm Follow Up Transport: Other (see comment)  The Plan for Transition of Care is Related to the Following Treatment Goals : resolution of acute symptoms  Discharge Location  Discharge Placement: Home     I sent email to registration to fix phone numbers and email to Jigna Jefferson to see about Medicaid application. Lucretia Mcmullen.  ZAIN Thompson  [facility]  [phone], Pager [phone]  [email or web address]

## 2020-12-06 NOTE — PROGRESS NOTES
Internal Medicine Progress Note    Patient's Name: Yohan Ybarra Date: 12/5/2020  Length of Stay: 1      Assessment/Plan     Principal Problem:    Pneumonia due to COVID-19 virus (12/5/2020)    Active Problems:    Acute respiratory failure ([address] (12/5/2020)      Resp failure 2/2 COVID PNA  - azithgilles rocephin  - positive COVID PCR on 11/30 (in care everywhere)  - appreciate ID  - decadron, sliding scale insulin  - remdesivir day 2/5  - PRN nebs  - stable on 2L NC  - BCx ngtd  - resp cx pending  - Cont acceptable home medications for chronic conditions   - DVT protocol    I have personally reviewed all pertinent labs and films that have officially resulted over the last 24 hours. I have personally checked for all pending labs that are awaiting final results. Anticipated discharge: >2 days      Subjective     Pt s/e @ bedside. No major events overnight. Pt offers no complaints this AM. Denies abd pain    Objective     Visit Vitals  /80   Pulse 89   Temp 97.6 °F (36.4 °C)   Resp 20   Ht 5' 11\" (1.803 m)   Wt 64.7 kg (142 lb 9.6 oz)   SpO2 92%   BMI 19.89 kg/m²       Physical Exam:  General Appearance: NAD, conversant  HENT: normocephalic/atraumatic, moist mucus membranes  Neck: No JVD, supple  Lungs: CTA with normal respiratory effort  CV: RRR, no m/r/g  Abdomen: soft, non-tender, normal bowel sounds  Extremities: no cyanosis, no peripheral edema  Neuro: No focal deficits, motor/sensory intact  Skin: Normal color, intact    Intake and Output:  Current Shift:  No intake/output data recorded.   Last three shifts:  12/04 1901 - 12/06 0700  In: 300 [I.V.:300]  Out: -     Lab/Data Reviewed:  BMP:   Lab Results   Component Value Date/Time     (L) 12/06/2020 04:41 AM    K 4.7 12/06/2020 04:41 AM     12/06/2020 04:41 AM    CO2 27 12/06/2020 04:41 AM    AGAP 6 12/06/2020 04:41 AM     (H) 12/06/2020 04:41 AM    BUN 14 12/06/2020 04:41 AM    CREA 1.10 12/06/2020 04:41 AM    GFRAA >60 12/06/2020 04:41 AM    GFRNA >60 12/06/2020 04:41 AM     CBC:   Lab Results   Component Value Date/Time    WBC 9.0 12/06/2020 04:41 AM    HGB 12.0 (L) 12/06/2020 04:41 AM    HCT 38.4 12/06/2020 04:41 AM     12/06/2020 04:41 AM       Imaging Reviewed:  No results found.     Medications Reviewed:  Current Facility-Administered Medications   Medication Dose Route Frequency    influenza vaccine 2020-21 (6 mos+)(PF) (FLUARIX/FLULAVAL/FLUZONE QUAD) injection 0.5 mL  0.5 mL IntraMUSCular PRIOR TO DISCHARGE    azithromycin (ZITHROMAX) 500 mg in 0.9% sodium chloride (MBP/ADV) 250 mL adv  500 mg IntraVENous Q24H    sodium chloride (NS) flush 5-10 mL  5-10 mL IntraVENous PRN    cefTRIAXone (ROCEPHIN) 2 g in 0.9% sodium chloride (MBP/ADV) 50 mL MBP  2 g IntraVENous Q24H    sodium chloride (NS) flush 5-40 mL  5-40 mL IntraVENous Q8H    sodium chloride (NS) flush 5-40 mL  5-40 mL IntraVENous PRN    albuterol-ipratropium (DUO-NEB) 2.5 MG-0.5 MG/3 ML  3 mL Nebulization Q4H PRN    enoxaparin (LOVENOX) injection 40 mg  40 mg SubCUTAneous Q12H    dexamethasone (DECADRON) 4 mg/mL injection 6 mg  6 mg IntraVENous Q24H    remdesivir 100 mg in 0.9% sodium chloride 250 mL IVPB  100 mg IntraVENous Q24H    sodium chloride (NS) flush 30 mL  30 mL IntraVENous DAILY    melatonin tablet 9 mg  9 mg Oral QHS         Abel Chahal PA-C  [address] [place] [address]  Hospitalist Division  Office:  [phone]  Pager: [phone]

## 2020-12-06 NOTE — ACP (ADVANCE CARE PLANNING)
12/6/2020  [address] [place] [address] Clinical Specialist  Conversation Note      Date of ACP Conversation: 12/06/20    Conversation Conducted with:  Patient with Decision Making Capacity    ACP Clinical Specialist: Corina Osborn RN    *When Decision Maker makes decisions on behalf of the incapacitated patient: Decision Maker is asked to consider and make decisions based on patient values, known preferences, or best interests. Health Care Decision Maker:    Current Designated Health Care Decision Maker:   (If there is a valid [address] named in the [address] [place] [address] Makers\" box in the ACP activity, but it is not visible above, be sure to open that field and then select the health care decision maker relationship (ie \"primary\") in the blank space to the right of the name.) Validate  this information as still accurate & up-to-date; edit [address] field as needed.)    Note: Assess and validate information in current ACP documents, as indicated. If no Decision Maker listed above or available through scanned documents, then:    If no Authorized Decision Maker has previously been identified, then patient chooses [address]:  \"Who would you like to name as your primary health care decision-maker? \"    Name Jose Antonio  Relationship: Wife Phone number: [phone]  Kayla Bedoya this person be reached easily? \" yes  \"Who would you like to name as your back-up decision maker? \"   Name: Lissette Jin Relationship:   Dad Phone number:[phone]/ [phone]  Kayla Bedoya this person be reached easily? \" yes    Note: If the relationship of these Decision-Makers to the patient does NOT follow your state's Next of Kin hierarchy, recommend that patient complete ACP document that meets state-specific requirements to allow them to act on the patient's behalf when appropriate. Care Preferences    Hospitalization:   \"If your health worsens and it becomes clear that your chance of recovery is unlikely, what would your preference be regarding hospitalization? \"    Choice:  []  The patient wants hospitalization  []  The patient prefers comfort-focused treatment without hospitalization. Ventilation: \"If you were in your present state of health and suddenly became very ill and were unable to breathe on your own, what would your preference be about the use of a ventilator (breathing machine) if it were available to you? \"      If patient would desire the use of a ventilator (breathing machine), answer \"yes\", if not \"no\":yes    \"If your health worsens and it becomes clear that your chance of recovery is unlikely, what would your preference be about the use of a ventilator (breathing machine) if it were available to you? \"     Would the patient desire the use of a ventilator (breathing machine)? YES      Resuscitation  \"CPR works best to restart the heart when there is a sudden event, like a heart attack, in someone who is otherwise healthy. Unfortunately, CPR does not typically restart the heart for people who have serious health conditions or who are very sick. \"    \"In the event your heart stopped as a result of an underlying serious health condition, would you want attempts to be made to restart your heart (answer \"yes\" for attempt to resuscitate) or would you prefer a natural death (answer \"no\" for do not attempt to resuscitate)? \" yes      NOTE: If the patient has a valid advance directive AND now provides care preference(s) that are inconsistent with that prior directive, advise the patient to consider either: creating a new advance directive that complies with state-specific requirements; or, if that is not possible, orally revoking that prior directive in accordance with state-specific requirements, which must be documented in the EHR. [] Yes  [] No   Educated Patient / Stefania Chowdhury regarding differences between Advance Directives and portable DNR orders.     Length of ACP Conversation in minutes:      Conversation Outcomes:  [x] ACP discussion completed  [] Existing advance directive reviewed with patient; no changes to patient's previously recorded wishes   [] New Advance Directive completed   [] Portable Do Not Resuscitate prepared for Provider review and signature  [] POLST/POST/MOLST/MOST prepared for Provider review and signature      Follow-up plan:    [] Schedule follow-up conversation to continue planning  [x] Referred individual to Provider for additional questions/concerns   [] Advised patient/agent/surrogate to review completed ACP document and update if needed with changes in condition, patient preferences or care setting     [x] This note routed to one or more involved healthcare providers

## 2020-12-06 NOTE — ED NOTES
Patient resting on stretcher- patient denies pain or any acute distress. Patient to be admitted - patient given update- awaiting bed assignment.

## 2020-12-06 NOTE — PROGRESS NOTES
TRANSFER - IN REPORT:    Verbal report received from Stan Rivera RN(name) on [facility]  being received from ED(unit) for routine progression of care. Report consisted of patients Situation, Background, Assessment and Recommendations(SBAR). Information from the following report(s) SBAR, ED Summary, MAR, Recent Results and Cardiac Rhythm S-tach--> NSR was reviewed with the receiving nurse. Opportunity for questions and clarification was provided. 1235:  Patient arrived with Stan Rivera RN, patient placed on Cardiac, Respiratory, and O2 monitor. 1900:  Bedside and Verbal shift change report given to Jake Reilly RN (oncoming nurse) by [facility] AT SARAH MENDEZ RN (offgoing nurse). Report included the following information SBAR, MAR, Recent Results and Cardiac Rhythm NSR.

## 2020-12-07 VITALS
TEMPERATURE: 97.9 F | OXYGEN SATURATION: 95 % | HEART RATE: 101 BPM | DIASTOLIC BLOOD PRESSURE: 76 MMHG | WEIGHT: 142.6 LBS | HEIGHT: 71 IN | BODY MASS INDEX: 19.96 KG/M2 | SYSTOLIC BLOOD PRESSURE: 116 MMHG | RESPIRATION RATE: 24 BRPM

## 2020-12-07 LAB
ALBUMIN SERPL-MCNC: 2.8 G/DL (ref 3.4–5)
ALBUMIN/GLOB SERPL: 0.8 {RATIO} (ref 0.8–1.7)
ALP SERPL-CCNC: 73 U/L (ref 45–117)
ALT SERPL-CCNC: 83 U/L (ref 16–61)
ANION GAP SERPL CALC-SCNC: 5 MMOL/L (ref 3–18)
APTT PPP: 29.8 SEC (ref 23–36.4)
AST SERPL-CCNC: 47 U/L (ref 10–38)
ATRIAL RATE: 91 BPM
BASOPHILS # BLD: 0 K/UL (ref 0–0.1)
BASOPHILS NFR BLD: 0 % (ref 0–2)
BILIRUB SERPL-MCNC: 0.9 MG/DL (ref 0.2–1)
BUN SERPL-MCNC: 19 MG/DL (ref 7–18)
BUN/CREAT SERPL: 21 (ref 12–20)
CALCIUM SERPL-MCNC: 8.5 MG/DL (ref 8.5–10.1)
CALCULATED P AXIS, ECG09: 55 DEGREES
CALCULATED R AXIS, ECG10: 33 DEGREES
CALCULATED T AXIS, ECG11: 17 DEGREES
CHLORIDE SERPL-SCNC: 105 MMOL/L (ref 100–111)
CO2 SERPL-SCNC: 27 MMOL/L (ref 21–32)
CREAT SERPL-MCNC: 0.91 MG/DL (ref 0.6–1.3)
D DIMER PPP FEU-MCNC: 0.48 UG/ML(FEU)
DIAGNOSIS, 93000: NORMAL
DIFFERENTIAL METHOD BLD: ABNORMAL
EOSINOPHIL # BLD: 0 K/UL (ref 0–0.4)
EOSINOPHIL NFR BLD: 0 % (ref 0–5)
ERYTHROCYTE [DISTWIDTH] IN BLOOD BY AUTOMATED COUNT: 11.4 % (ref 11.6–14.5)
FIBRINOGEN PPP-MCNC: 725 MG/DL (ref 210–451)
GLOBULIN SER CALC-MCNC: 3.7 G/DL (ref 2–4)
GLUCOSE SERPL-MCNC: 119 MG/DL (ref 74–99)
HCT VFR BLD AUTO: 35 % (ref 36–48)
HGB BLD-MCNC: 10.8 G/DL (ref 13–16)
INR PPP: 1.1 (ref 0.8–1.2)
LYMPHOCYTES # BLD: 1 K/UL (ref 0.9–3.6)
LYMPHOCYTES NFR BLD: 8 % (ref 21–52)
MCH RBC QN AUTO: 28.8 PG (ref 24–34)
MCHC RBC AUTO-ENTMCNC: 30.9 G/DL (ref 31–37)
MCV RBC AUTO: 93.3 FL (ref 74–97)
MONOCYTES # BLD: 1.5 K/UL (ref 0.05–1.2)
MONOCYTES NFR BLD: 13 % (ref 3–10)
NEUTS SEG # BLD: 9.3 K/UL (ref 1.8–8)
NEUTS SEG NFR BLD: 79 % (ref 40–73)
P-R INTERVAL, ECG05: 126 MS
PLATELET # BLD AUTO: 442 K/UL (ref 135–420)
PMV BLD AUTO: 12 FL (ref 9.2–11.8)
POTASSIUM SERPL-SCNC: 5.2 MMOL/L (ref 3.5–5.5)
PROT SERPL-MCNC: 6.5 G/DL (ref 6.4–8.2)
PROTHROMBIN TIME: 13.7 SEC (ref 11.5–15.2)
Q-T INTERVAL, ECG07: 370 MS
QRS DURATION, ECG06: 86 MS
QTC CALCULATION (BEZET), ECG08: 455 MS
RBC # BLD AUTO: 3.75 M/UL (ref 4.7–5.5)
SODIUM SERPL-SCNC: 137 MMOL/L (ref 136–145)
VENTRICULAR RATE, ECG03: 91 BPM
WBC # BLD AUTO: 11.8 K/UL (ref 4.6–13.2)

## 2020-12-07 PROCEDURE — 80053 COMPREHEN METABOLIC PANEL: CPT

## 2020-12-07 PROCEDURE — 85379 FIBRIN DEGRADATION QUANT: CPT

## 2020-12-07 PROCEDURE — 85025 COMPLETE CBC W/AUTO DIFF WBC: CPT

## 2020-12-07 PROCEDURE — 85384 FIBRINOGEN ACTIVITY: CPT

## 2020-12-07 PROCEDURE — 94762 N-INVAS EAR/PLS OXIMTRY CONT: CPT

## 2020-12-07 PROCEDURE — 77010033678 HC OXYGEN DAILY

## 2020-12-07 PROCEDURE — 74011250636 HC RX REV CODE- 250/636: Performed by: INTERNAL MEDICINE

## 2020-12-07 PROCEDURE — 85610 PROTHROMBIN TIME: CPT

## 2020-12-07 PROCEDURE — 85730 THROMBOPLASTIN TIME PARTIAL: CPT

## 2020-12-07 RX ORDER — DEXAMETHASONE 6 MG/1
6 TABLET ORAL
Qty: 9 TAB | Refills: 0 | Status: SHIPPED | OUTPATIENT
Start: 2020-12-07 | End: 2020-12-16

## 2020-12-07 RX ORDER — ENOXAPARIN SODIUM 100 MG/ML
30 INJECTION SUBCUTANEOUS EVERY 12 HOURS
Status: DISCONTINUED | OUTPATIENT
Start: 2020-12-07 | End: 2020-12-07 | Stop reason: HOSPADM

## 2020-12-07 RX ADMIN — Medication 10 ML: at 06:05

## 2020-12-07 RX ADMIN — ENOXAPARIN SODIUM 40 MG: 40 INJECTION SUBCUTANEOUS at 04:04

## 2020-12-07 NOTE — PROGRESS NOTES
Bedside shift change report given to Terri Gusman RN (oncoming nurse) by Megan Alves (offgoing nurse). Report included the following information SBAR, Kardex, ED Summary, Intake/Output, MAR, Recent Results, Cardiac Rhythm NSR and Alarm Parameters . 2000: Assessment completed. On 2L NC. Alert and pleasant. 2130: Pt. Up to bathroom. 2200: Meds given. 0000: Reassessment completed. No changes. Ambulated to bathroom. No c/o.     0100: Pt. Up to bathroom. Gown changed. 0400: Reassessment completed. No changes. Labs drawn. Meds given. Up to bathroom. 0700: Bedside shift change report given to Manpreet Nugent RN (oncoming nurse) by Terri Gusman RN (offgoing nurse). Report included the following information SBAR, Kardex, ED Summary, Intake/Output, MAR, Recent Results, Cardiac Rhythm NSR and Alarm Parameters .

## 2020-12-07 NOTE — PROGRESS NOTES
0900 Ambulating in the room with Room air. O2 sat @ %    1055 D/c instruction given. Verbalized understanding. Pt refused flu vaccine. 1129 Discharge to home stable.

## 2020-12-07 NOTE — DISCHARGE SUMMARY
Internal Medicine Discharge Summary        Patient: Dayanara Rae    YOB: 1980    Age:  41 y.o. Admit Date: 12/5/2020    Discharge Date: 12/7/2020    LOS:  LOS: 2 days     Discharge To: Home    Consults: None    Admission Diagnoses: Pneumonia due to COVID-19 virus [U07.1, J12.89]  Pneumonia due to COVID-19 virus [U07.1, J12.89]    Discharge Diagnoses:    Problem List as of 12/7/2020 Never Reviewed          Codes Class Noted - Resolved    * (Principal) Pneumonia due to COVID-19 virus ICD-10-CM: U07.1, J12.89  ICD-9-CM: 480.8  12/5/2020 - Present        Acute respiratory failure [facility]) ICD-10-CM: J96.00  ICD-9-CM: 518.81  12/5/2020 - Present              Discharge Condition:  Improved    Procedures: None         HPI: Mr. Leandro Lees is a 41 y.o.  male who is admitted for COVID pneumonia . Mr. Leandro Lees with past medical history as noted below , presented to the Emergency Department today  complaining of above. Triage and ER notes noted. ER MD wanted to admit the patient to the hospital for further management and called hospitalist to facilitate this process. patient tested positive for COVID at [facility] 3 days ago , presented with worsening SOB, cough with mucous phlegm production. He has no DM , not a smoker and no known immuno comprised condition. At our ER, he tested COVID test negative , rapid one. CXR reported with bilateral infiltrate. Patient was coughing and bringing phlegm , whitish mucous when I asked him to take deep breath. Hospital Course:    Pneumonia due to CoVID-19 - Patient with O2 sats low 90s. Admitted and given one dose of Remdesivir. HHe was also treated with IV rocephin and azithro and decadron. He improved and was ambulating on room air with O2 sats in the 100s on the day of discharge with only minimal shortness of breath. Given this, he no longer requires Remdesivir and can be discharged on decadron alone to complete 10 day total course.  I discussed self-isolation requirements thoroughly with him. His wife is also positive. The rest of the patient's chronic conditions were managed appropriately during their admission. They were medically stable at the time of discharge. Visit Vitals  /76   Pulse (!) 101   Temp 97.9 °F (36.6 °C)   Resp 24   Ht 5' 11\" (1.803 m)   Wt 64.7 kg (142 lb 9.6 oz)   SpO2 95%   BMI 19.89 kg/m²       Physical Exam at Discharge:  General Appearance: NAD, conversant  HENT: normocephalic/atraumatic, moist mucus membranes  Lungs: CTA with normal respiratory effort  CV: RRR, no m/r/g  Abdomen: soft, non-tender, normal bowel sounds  Extremities: no cyanosis, no peripheral edema  Neuro: moves all extremities, no focal deficits  Psych: appropriate affect, alert and oriented to person, place and time    Labs Prior to Discharge:  Labs: Results:       Chemistry Recent Labs     12/07/20 0400 12/06/20 0441 12/05/20  1125   * 138* 102*    134* 133*   K 5.2 4.7 4.4    101 100   CO2 27 27 27   BUN 19* 14 14   CREA 0.91 1.10 1.28   CA 8.5 8.6 8.7   AGAP 5 6 6   BUCR 21* 13 11*   AP 73 73 74   TP 6.5 6.9 7.1   ALB 2.8* 3.0* 3.2*   GLOB 3.7 3.9 3.9   AGRAT 0.8 0.8 0.8      CBC w/Diff Recent Labs     12/07/20 0400 12/06/20 0441 12/05/20  1125   WBC 11.8 9.0 12.1   RBC 3.75* 4.14* 4.38*   HGB 10.8* 12.0* 12.8*   HCT 35.0* 38.4 40.1   * 364 319   GRANS 79* 69 65   LYMPH 8* 12* 12*   EOS 0 0 0      Cardiac Enzymes No results for input(s): CPK, CKND1, CONNOR in the last 72 hours.     No lab exists for component: CKRMB, TROIP   Coagulation Recent Labs     12/07/20 0400 12/06/20 0441   PTP 13.7 13.9   INR 1.1 1.1   APTT 29.8 28.4       Lipid Panel Lab Results   Component Value Date/Time    Cholesterol, total 123 (L) 10/18/2017 12:11 PM    HDL Cholesterol 45 10/18/2017 12:11 PM    LDL, calculated 52 10/18/2017 12:11 PM    Triglyceride 130 10/18/2017 12:11 PM    CHOL/HDL Ratio 2.7 10/18/2017 12:11 PM      BNP No results for input(s): BNPP in the last 72 hours. Liver Enzymes Recent Labs     12/07/20  0400   TP 6.5   ALB 2.8*   AP 73      Thyroid Studies No results found for: T4, T3U, TSH, TSHEXT         Significant Imaging:  Xr Chest Port    Result Date: 12/5/2020  EXAM: CHEST RADIOGRAPH, SINGLE VIEW CLINICAL INDICATION/HISTORY: Short of breath. Cough COMPARISON: None. TECHNIQUE: Portable frontal view of the chest was obtained. _______________ FINDINGS: SUPPORT DEVICES: None. HEART AND MEDIASTINUM: Cardiomediastinal silhouette appears within normal limits. Normal caliber thoracic aorta. No central vascular congestion. LUNGS AND PLEURAL SPACES: Bilateral patchy infiltrates are present. No pleural effusion or pneumothorax. BONY THORAX AND SOFT TISSUES: No acute osseous abnormality. _______________     IMPRESSION: 1. Bilateral patchy infiltrates. Appearance could be seen with Covid pneumonia. Discharge Medications:     Current Discharge Medication List      START taking these medications    Details   dexAMETHasone (Decadron) 6 mg tablet Take 1 Tab by mouth Daily (before breakfast) for 9 days. Qty: 9 Tab, Refills: 0             Activity: Activity as tolerated    Diet: Resume previous diet    Wound Care: None needed    Follow-up:   Please follow up with your PCP within 7 days to discuss your recent hospitalization. Patient to arrange.          Total time spent including time spent on final examination and discharge discussion, discharge documentation and records reviewed and medication reconciliation: > 30 minutes    Joli Runner, DO  Internal Medicine, Hospitalist  Pager: [phone] [facility]

## 2020-12-07 NOTE — DISCHARGE INSTRUCTIONS
Patient Education        10 Things to Do When You Have COVID-19    Stay home. Don't go to school, work, or public areas. And don't use public transportation, ride-shares, or taxis unless you have no choice. Leave your home only if you need to get medical care. But call the doctor's office first so they know you're coming. And wear a cloth face cover. Ask before leaving isolation. Talk with your doctor or other health professional about when it will be safe for you to leave isolation. Wear a cloth face cover when you are around other people. It can help stop the spread of the virus when you cough or sneeze. Limit contact with people in your home. If possible, stay in a separate bedroom and use a separate bathroom. Avoid contact with pets and other animals. If possible, have a friend or family member care for them while you're sick. Cover your mouth and nose with a tissue when you cough or sneeze. Then throw the tissue in the trash right away. Wash your hands often, especially after you cough or sneeze. Use soap and water, and scrub for at least 20 seconds. If soap and water aren't available, use an alcohol-based hand . Don't share personal household items. These include bedding, towels, cups and glasses, and eating utensils. Clean and disinfect your home every day. Use household  or disinfectant wipes or sprays. Take special care to clean things that you grab with your hands. These include doorknobs, remote controls, phones, and handles on your refrigerator and microwave. And don't forget countertops, tabletops, bathrooms, and computer keyboards. Take acetaminophen (Tylenol) to relieve fever and body aches. Read and follow all instructions on the label. Current as of: July 10, 2020               Content Version: 12.6  © 2006-2020 [employer].    Care instructions adapted under license by [employer] (which disclaims liability or warranty for this information). If you have questions about a medical condition or this instruction, always ask your healthcare professional. [place] [address] any warranty or liability for your use of this information.

## 2020-12-07 NOTE — ROUTINE PROCESS
1836 Pt called the nursing station; stating that his Rx Decadron will be filled up tomorrow by [facility]. Dr. Laura Venegas notified just now. Pt stated he can take the med tomorrow. Will notify Mr. Gonzalez All that its ok to restart med Decadron tomorrow per Dr. Laura Venegas.

## 2020-12-08 ENCOUNTER — PATIENT OUTREACH (OUTPATIENT)
Dept: CASE MANAGEMENT | Age: 40
End: 2020-12-08

## 2020-12-08 LAB
BACTERIA SPEC CULT: ABNORMAL
BACTERIA SPEC CULT: ABNORMAL
GRAM STN SPEC: ABNORMAL
SERVICE CMNT-IMP: ABNORMAL

## 2020-12-08 NOTE — PROGRESS NOTES
Patient's wife returned my call and stated that she was also quarantining and they were in different rooms. Wife stated that she did not know how he was doing but gave me an accurate contact number for the patient. CTN changed number in the chart. Patient contacted regarding COVID-19 diagnosis. Discussed COVID-19 related testing which was available at this time. Test results were negative. 20  Patient reports that he had a positive COVID 19 test on 20 and started feeling bad on Thanksgiving day. Patient informed of results, if available? yes. Outreach made within 2 business days of discharge: Yes    Care Transition Nurse/ Ambulatory Care Manager/ LPN Care Coordinator contacted the patient by telephone to perform post discharge assessment. Verified name and  with patient as identifiers. Provided introduction to self, and explanation of the CTN/ACM/LPN role, and reason for call due to risk factors for infection and/or exposure to COVID-19. Symptoms reviewed with patient who verbalized the following symptoms: fatigue and cough. Due to no new or worsening symptoms encounter was not routed to provider for escalation. Discussed follow-up appointments. If no appointment was previously scheduled, appointment scheduling offered: [facility] follow up appointment(s):   Future Appointments   Date Time Provider Marco Stiles   12/10/2020 10:15 AM [facility]     [facility] follow up appointment(s): none      Advance Care Planning:   Does patient have an Advance Directive: currently not on file; education provided     Patient has following risk factors of: pneumonia. CTN/ACM/LPN reviewed discharge instructions, medical action plan and red flags such as increased shortness of breath, increasing fever and signs of decompensation with patient who verbalized understanding.    Discussed exposure protocols and quarantine with CDC Guidelines What to do if you are sick with coronavirus disease 2019.  Patient was given an opportunity for questions and concerns. The patient agrees to contact the [employer] exposure line [phone], local [facility] department [facility]  ([phone]) and PCP office for questions related to their healthcare. CTN/ACM provided contact information for future needs. Reviewed and educated patient on any new and changed medications related to discharge diagnosis. Patient/family/caregiver given information for [employer] and agrees to enroll no      Plan for follow-up call in 5-7 days based on severity of symptoms and risk factors.

## 2020-12-11 LAB
BACTERIA SPEC CULT: NORMAL
BACTERIA SPEC CULT: NORMAL
SERVICE CMNT-IMP: NORMAL
SERVICE CMNT-IMP: NORMAL

## 2020-12-11 NOTE — PROGRESS NOTES
Left message on answering machine for patient to call back. Physical Exam  Skin:           Dual Skin Assessment completed with LEANN Lawton.

## 2020-12-16 ENCOUNTER — PATIENT OUTREACH (OUTPATIENT)
Dept: CASE MANAGEMENT | Age: 40
End: 2020-12-16

## 2020-12-16 NOTE — PROGRESS NOTES
Feels like he is doing much better. Patient completed his decadron this morning. Patient does not have a PCP. patient reports that his medicaid application has been approved. CTN contacted the medical group to set the patient up with a PCP to establish care. Patient contacted regarding COVID-19 risk and screening. Discussed COVID-19 related testing which was available at this time. Test results were positive. Patient informed of results, if available? yes patient has now been in quarantine for 9 days since discharge from [facility]. Patient reports being fever free for past 3 days. Care Transition Nurse/ Ambulatory Care Manager/ LPN Care Coordinator contacted the patient by telephone to perform follow-up assessment. Verified name and  with patient as identifiers. Patient has following risk factors of: no known risk factors. Symptoms reviewed with patient who verbalized the following symptoms: shortness of breath, no new symptoms and no worsening symptoms. Due to no new or worsening symptoms encounter was not routed to provider for escalation. Education provided regarding infection prevention, and signs and symptoms of COVID-19 and when to seek medical attention with patient who verbalized understanding. Discussed exposure protocols and quarantine from [address] you at higher risk for severe illness 2019 and given an opportunity for questions and concerns. The patient agrees to contact the COVID-19 hotline 718-000-5152 or PCP office for questions related to their healthcare. CTN/ACM/LPN provided contact information for future reference. From CDC: Are you at higher risk for severe illness?  Wash your hands often.  Avoid close contact (6 feet, which is about two arm lengths) with people who are sick.  Put distance between yourself and other people if COVID-19 is spreading in your community.  Clean and disinfect frequently touched surfaces.    Avoid all cruise travel and non-essential air travel.  Call your healthcare professional if you have concerns about COVID-19 and your underlying condition or if you are sick. For more information on steps you can take to protect yourself, see CDC's How to [place] for follow-up call in 7-14 days based on severity of symptoms and risk factors.

## 2020-12-28 ENCOUNTER — VIRTUAL VISIT (OUTPATIENT)
Dept: FAMILY MEDICINE CLINIC | Age: 40
End: 2020-12-28
Payer: MEDICAID

## 2020-12-28 DIAGNOSIS — D64.9 ANEMIA, UNSPECIFIED TYPE: Primary | ICD-10-CM

## 2020-12-28 PROCEDURE — 99203 OFFICE O/P NEW LOW 30 MIN: CPT | Performed by: STUDENT IN AN ORGANIZED HEALTH CARE EDUCATION/TRAINING PROGRAM

## 2020-12-28 RX ORDER — DOCUSATE SODIUM 100 MG/1
100 CAPSULE, LIQUID FILLED ORAL 2 TIMES DAILY
Qty: 60 CAP | Refills: 2 | Status: SHIPPED | OUTPATIENT
Start: 2020-12-28 | End: 2021-03-28

## 2020-12-28 RX ORDER — FERROUS SULFATE 325(65) MG
325 TABLET, DELAYED RELEASE (ENTERIC COATED) ORAL
Qty: 100 TAB | Refills: 12 | Status: SHIPPED | OUTPATIENT
Start: 2020-12-28 | End: 2022-04-11

## 2020-12-28 NOTE — PROGRESS NOTES
[facility], who was evaluated through a synchronous (real-time) audio-video encounter, and/or his healthcare decision maker, is aware that it is a billable service, with coverage as determined by his insurance carrier. He provided verbal consent to proceed: Yes, and patient identification was verified. It was conducted pursuant to the emergency declaration under the [facility], [facility] authority and the [employer] and [employer] Act. A caregiver was present when appropriate. Ability to conduct physical exam was limited. I was in the office. The patient was at home. History of Present Illness  Demetrius Lake is a 41 y.o. male who presents today for management of    Chief Complaint   Patient presents with   [address]       Patient is here to establish care. Previous PCP: has not had one in a while    Patient was diagnosed with COVID-19 at the beginning of the month. States that he had symptoms with shortness of breath and required going to the hospital twice second time around was diagnosed with Covid pneumonia and pneumonia. Finished steroid treatment that was prescribed for him. States that this time he is feeling better. Patient was unaware lab studies show slight pneumonia. Patient stated that he did feel very cold in his toes all recovering from Covid. Denies any medical history. Past Medical History  No past medical history on file. Surgical History  No past surgical history on file. Current Medications  Current Outpatient Medications   Medication Sig    ferrous sulfate (IRON) 325 mg (65 mg iron) EC tablet Take 1 Tab by mouth three (3) times daily (with meals).  docusate sodium (COLACE) 100 mg capsule Take 1 Cap by mouth two (2) times a day for 90 days. No current facility-administered medications for this visit.         Allergies/Drug Reactions  No Known Allergies     Family History  Family History   Problem Relation Age of Onset    Heart Attack Father 61    Hypertension Brother         Social History  Social History     Tobacco Use    Smoking status: Former Smoker     Quit date: 2019     Years since quittin.9    Smokeless tobacco: Never Used   Substance Use Topics    Alcohol use: Yes     Alcohol/week: 6.0 - 8.0 standard drinks     Types: 1 - 2 Glasses of wine, 5 - 6 Shots of liquor per week     Frequency: 2-3 times a week     Drinks per session: 5 or 6     Binge frequency: Weekly    Drug use: Never        Health Maintenance   Topic Date Due    Pneumococcal 0-64 years (1 of 1 - PPSV23) 1984    Flu Vaccine (1) 2020    Lipid Screen  10/18/2022    DTaP/Tdap/Td series (2 - Td) 2030     There is no immunization history for the selected administration types on file for this patient. Review of Systems  Review of Systems   Constitutional: Negative for chills, fever and malaise/fatigue. HENT: Negative for congestion, ear discharge and ear pain. Eyes: Negative for blurred vision, pain and discharge. Respiratory: Negative for cough and shortness of breath. Cardiovascular: Negative for chest pain and palpitations. Gastrointestinal: Negative for abdominal pain, nausea and vomiting. Genitourinary: Negative for dysuria, frequency and urgency. Skin: Negative for itching and rash. Neurological: Negative for dizziness, seizures, loss of consciousness and headaches. Psychiatric/Behavioral: Negative for substance abuse. Physical Exam  Vital signs: There were no vitals filed for this visit. Physical Exam  Constitutional:       Appearance: Normal appearance. Eyes:      General: No scleral icterus. Right eye: No discharge. Left eye: No discharge. Neck:      Musculoskeletal: Normal range of motion and neck supple. Pulmonary:      Effort: Pulmonary effort is normal. No respiratory distress.    Neurological:      Mental Status: He is alert and oriented to person, place, and time. Cranial Nerves: No cranial nerve deficit. Psychiatric:         Mood and Affect: Mood normal.         Behavior: Behavior normal.         Thought Content: Thought content normal.         Judgment: Judgment normal.           Laboratory/Tests:      Assessment/Plan:    1. Anemia, unspecified type  Discussed with patient that we will have him start taking iron in order to help with the anemia. Patient is to take a new CBC in about 3 weeks in order to see if anemia has improved. - ferrous sulfate (IRON) 325 mg (65 mg iron) EC tablet; Take 1 Tab by mouth three (3) times daily (with meals). Dispense: 100 Tab; Refill: 12  - docusate sodium (COLACE) 100 mg capsule; Take 1 Cap by mouth two (2) times a day for 90 days. Dispense: 60 Cap; Refill: 2  - CBC WITH AUTOMATED DIFF; Future         Lab review: orders written for new lab studies as appropriate; see orders      I have discussed the diagnosis with the patient and the intended plan as seen in the above orders. Questions were answered concerning future plans. I have discussed medication side effects and warnings with the patient as well. I have reviewed the plan of care with the patient, accepted their input and they are in agreement with the treatment goals.        Ren Linton MD  December 28, 2020

## 2021-05-21 ENCOUNTER — PATIENT OUTREACH (OUTPATIENT)
Dept: CASE MANAGEMENT | Age: 41
End: 2021-05-21

## 2021-05-21 NOTE — PROGRESS NOTES
Patient called and asked for the contact information for his PCP. Patient identified with 2 identifiers; name and date of birth. CTN provided patient with contact information for the PCP on file.

## 2021-07-19 NOTE — PROGRESS NOTES
12/8/2020 1:54 PM  Call placed to patient at both numbers. Attempted to reach patient for hospital follow up. No answer and there was no way to leave a message because no voice mail. not examined

## 2021-07-27 ENCOUNTER — OFFICE VISIT (OUTPATIENT)
Dept: FAMILY MEDICINE CLINIC | Age: 41
End: 2021-07-27
Payer: MEDICAID

## 2021-07-27 VITALS
SYSTOLIC BLOOD PRESSURE: 123 MMHG | OXYGEN SATURATION: 95 % | RESPIRATION RATE: 20 BRPM | WEIGHT: 273 LBS | HEART RATE: 80 BPM | BODY MASS INDEX: 38.08 KG/M2 | DIASTOLIC BLOOD PRESSURE: 77 MMHG | TEMPERATURE: 97.8 F

## 2021-07-27 DIAGNOSIS — M25.561 ACUTE PAIN OF RIGHT KNEE: ICD-10-CM

## 2021-07-27 DIAGNOSIS — D64.9 ANEMIA, UNSPECIFIED TYPE: ICD-10-CM

## 2021-07-27 DIAGNOSIS — R25.2 MUSCLE CRAMPS: Primary | ICD-10-CM

## 2021-07-27 DIAGNOSIS — F51.01 PRIMARY INSOMNIA: ICD-10-CM

## 2021-07-27 PROCEDURE — 99214 OFFICE O/P EST MOD 30 MIN: CPT | Performed by: STUDENT IN AN ORGANIZED HEALTH CARE EDUCATION/TRAINING PROGRAM

## 2021-07-27 RX ORDER — CYCLOBENZAPRINE HCL 10 MG
10 TABLET ORAL
Qty: 15 TABLET | Refills: 0 | Status: SHIPPED | OUTPATIENT
Start: 2021-07-27 | End: 2022-04-11 | Stop reason: SINTOL

## 2021-07-27 NOTE — PATIENT INSTRUCTIONS

## 2021-07-27 NOTE — PROGRESS NOTES
Fede Schafer is a 41 y.o.  male and presents with    Chief Complaint   Patient presents with    Follow-up           Subjective:   Sharp pain in his back. Though it was due to working out. On the L side. Went to [facility] and had kd testing and was good. Has been going on for about 1 yr. Stays on the L side. Stabbing pain that last for about a ocuple of minutes, and fixes his posture and eventually goes away. Having trouble falling and staying asleep. Sleeping about 4 hours. Might miss work d/t being so tired. Tried melatonin, and it seemed to help. Patient goes to the living room in order to watch TV, while his wife watches TV from the bedroom. States that he does get tired however he is watching usually he stays up. Tends to go to bed late with his wife, since wife comes home around midnight from work. Recently R knee starting hurting. It pop. He feels like his knee gives out on him. Previously in nursing home patient had x-ray on knee due to issues in the area. Was told that everything was okay at that time. Does not take medication on hands pain since patient does not like taking medication. Patient Active Problem List   Diagnosis Code    Pneumonia due to COVID-19 virus U07.1, J12.82    [facility]      No past medical history on file. No past surgical history on file. Family History   Problem Relation Age of Onset    Heart Attack Father 61    Hypertension Brother      Social History     Socioeconomic History    Marital status:      Spouse name: Not on file    Number of children: Not on file    Years of education: Not on file    Highest education level: Not on file   Occupational History    Not on file   Tobacco Use    Smoking status: Former Smoker     Quit date: 2019     Years since quittin.5    Smokeless tobacco: Never Used   Substance and Sexual Activity    Alcohol use:  Yes     Alcohol/week: 6.0 - 8.0 standard drinks     Types: 1 - 2 Glasses of wine, 5 - 6 Shots of liquor per week    Drug use: Never    Sexual activity: Not on file   Other Topics Concern    Not on file   Social History Narrative    Not on file     Social Determinants of Health     Financial Resource Strain:     Difficulty of Paying Living Expenses:    Food Insecurity:     Worried About Running Out of Food in the Last Year:     [address] in the Last Year:    Transportation Needs:     Lack of Transportation (Medical):  Lack of Transportation (Non-Medical):    Physical Activity:     Days of Exercise per Week:     Minutes of Exercise per Session:    Stress:     Feeling of Stress :    Social Connections:     Frequency of Communication with Friends and Family:     Frequency of Social Gatherings with Friends and Family:     Attends V[employer] Services:     Active Member of Clubs or Organizations:     Attends Club or Organization Meetings:     Marital Status:    Intimate Partner Violence:     Fear of Current or Ex-Partner:     Emotionally Abused:     Physically Abused:     Sexually Abused:         Current Outpatient Medications   Medication Sig Dispense Refill    ferrous sulfate (IRON) 325 mg (65 mg iron) EC tablet Take 1 Tab by mouth three (3) times daily (with meals). 100 Tab 12        ROS   Review of Systems   Constitutional: Negative for chills, fever and malaise/fatigue. HENT: Negative for congestion, ear discharge and ear pain. Eyes: Negative for blurred vision, pain and discharge. Respiratory: Negative for cough and shortness of breath. Cardiovascular: Negative for chest pain and palpitations. Gastrointestinal: Negative for abdominal pain, nausea and vomiting. Genitourinary: Negative for dysuria, frequency and urgency. Musculoskeletal: Positive for back pain, joint pain and myalgias. Skin: Negative for itching and rash. Neurological: Negative for dizziness, seizures, loss of consciousness and headaches. Psychiatric/Behavioral: Negative for substance abuse. The patient has insomnia. Objective:  Vitals:    07/27/21 1641   BP: 123/77   Pulse: 80   Resp: 20   Temp: 97.8 °F (36.6 °C)   SpO2: 95%   Weight: 273 lb (123.8 kg)   PainSc:  10 - Worst pain ever   PainLoc: Back       Physical Exam  Vitals reviewed. Constitutional:       General: He is not in acute distress. Appearance: Normal appearance. He is not ill-appearing. HENT:      Nose: Nose normal. No congestion or rhinorrhea. Eyes:      General:         Right eye: No discharge. Left eye: No discharge. Conjunctiva/sclera: Conjunctivae normal.   Cardiovascular:      Rate and Rhythm: Normal rate and regular rhythm. Pulses: Normal pulses. Pulmonary:      Effort: Pulmonary effort is normal.      Breath sounds: Normal breath sounds. Musculoskeletal:         General: Tenderness (Lower lumbar right-sided paraspinal) present. No swelling, deformity or signs of injury. Normal range of motion. Cervical back: Normal range of motion and neck supple. Right lower leg: No edema. Left lower leg: No edema. Comments: Slight crepitus appreciated over right knee. Neurological:      Mental Status: He is alert and oriented to person, place, and time. Psychiatric:         Mood and Affect: Mood normal.         Behavior: Behavior normal.         Thought Content: Thought content normal.         Judgment: Judgment normal.           LABS     TESTS      Assessment/Plan:    1. Muscle cramps  She is to having muscle cramps in his back. Will test for possibilities of etiology of cramps. However likely to be an MSK issue.  - METABOLIC PANEL, COMPREHENSIVE; Future  - TSH 3RD GENERATION; Future  - cyclobenzaprine (FLEXERIL) 10 mg tablet; Take 1 Tablet by mouth three (3) times daily as needed for Muscle Spasm(s). Dispense: 15 Tablet; Refill: 0    2.  Anemia, unspecified type  Seen on pevious labs  - CBC WITH AUTOMATED DIFF; Future    3. Acute pain of right knee  Likely to be a tendon issue, however will have x-ray of knee done. - XR KNEE RT MIN 4 V; Future    4. Primary insomnia  Discussed sleep hygiene with patient. Melatonin until next appointment to see if we can improve sleep and patient. Discussed importance necessity of having a good sleep. He should be sleeping between 7 to 8 hours. Lab review: orders written for new lab studies as appropriate; see orders      I have discussed the diagnosis with the patient and the intended plan as seen in the above orders. The patient has received an after-visit summary and questions were answered concerning future plans. I have discussed medication side effects and warnings with the patient as well. I have reviewed the plan of care with the patient, accepted their input and they are in agreement with the treatment goals.          Deanna Alfredo MD

## 2021-07-27 NOTE — PROGRESS NOTES
Yahaira Barnett presents today for   Chief Complaint   Patient presents with    Follow-up       Is someone accompanying this pt? no    Is the patient using any DME equipment during OV? no    Depression Screening:  3 most recent PHQ Screens 7/27/2021   Little interest or pleasure in doing things Not at all   Feeling down, depressed, irritable, or hopeless Not at all   Total Score PHQ 2 0       Learning Assessment:  No flowsheet data found. Abuse Screening:  No flowsheet data found. Fall Risk  No flowsheet data found. Health Maintenance reviewed and discussed and ordered per Provider. Health Maintenance Due   Topic Date Due    Hepatitis C Screening  Never done    COVID-19 Vaccine (1) Never done   . Coordination of Care:  1. Have you been to the ER, urgent care clinic since your last visit? Hospitalized since your last visit? no    2. Have you seen or consulted any other health care providers outside of the [address] [place] [address] since your last visit? Include any pap smears or colon screening.  no      Last  Checked na  Last UDS Checked na  Last Pain contract signed: marlee

## 2021-08-02 ENCOUNTER — HOSPITAL ENCOUNTER (OUTPATIENT)
Dept: LAB | Age: 41
Discharge: HOME | End: 2021-08-02

## 2021-08-02 LAB — SENTARA SPECIMEN COL,SENBCF: NORMAL

## 2021-08-02 PROCEDURE — 99001 SPECIMEN HANDLING PT-LAB: CPT

## 2021-08-03 LAB
A-G RATIO,AGRAT: 1.7 RATIO (ref 1.1–2.6)
ABSOLUTE LYMPHOCYTE COUNT, 10803: 1.7 K/UL (ref 1–4.8)
ALBUMIN SERPL-MCNC: 4.4 G/DL (ref 3.5–5)
ALP SERPL-CCNC: 65 U/L (ref 25–115)
ALT SERPL-CCNC: 55 U/L (ref 5–40)
ANION GAP SERPL CALC-SCNC: 10 MMOL/L (ref 3–15)
AST SERPL W P-5'-P-CCNC: 23 U/L (ref 10–37)
BASOPHILS # BLD: 0.1 K/UL (ref 0–0.2)
BASOPHILS NFR BLD: 1 % (ref 0–2)
BILIRUB SERPL-MCNC: 1 MG/DL (ref 0.2–1.2)
BUN SERPL-MCNC: 13 MG/DL (ref 6–22)
CALCIUM SERPL-MCNC: 10.4 MG/DL (ref 8.4–10.5)
CHLORIDE SERPL-SCNC: 104 MMOL/L (ref 98–110)
CO2 SERPL-SCNC: 25 MMOL/L (ref 20–32)
CREAT SERPL-MCNC: 1 MG/DL (ref 0.5–1.2)
EOSINOPHIL # BLD: 0.2 K/UL (ref 0–0.5)
EOSINOPHIL NFR BLD: 3 % (ref 0–6)
ERYTHROCYTE [DISTWIDTH] IN BLOOD BY AUTOMATED COUNT: 11.8 % (ref 10–15.5)
GFRAA, 66117: >60
GFRNA, 66118: >60
GLOBULIN,GLOB: 2.6 G/DL (ref 2–4)
GLUCOSE SERPL-MCNC: 81 MG/DL (ref 70–99)
GRANULOCYTES,GRANS: 58 % (ref 40–75)
HCT VFR BLD AUTO: 48.6 % (ref 36.6–51.9)
HGB BLD-MCNC: 14.8 G/DL (ref 13.2–17.3)
LYMPHOCYTES, LYMLT: 28 % (ref 20–45)
MCH RBC QN AUTO: 29 PG (ref 26–34)
MCHC RBC AUTO-ENTMCNC: 31 G/DL (ref 31–36)
MCV RBC AUTO: 95 FL (ref 80–95)
MONOCYTES # BLD: 0.7 K/UL (ref 0.1–1)
MONOCYTES NFR BLD: 11 % (ref 3–12)
NEUTROPHILS # BLD AUTO: 3.5 K/UL (ref 1.8–7.7)
PLATELET # BLD AUTO: 311 K/UL (ref 140–440)
PMV BLD AUTO: 12.1 FL (ref 9–13)
POTASSIUM SERPL-SCNC: 4.9 MMOL/L (ref 3.5–5.5)
PROT SERPL-MCNC: 7 G/DL (ref 6.4–8.3)
RBC # BLD AUTO: 5.14 M/UL (ref 3.8–5.8)
SODIUM SERPL-SCNC: 139 MMOL/L (ref 133–145)
TSH SERPL DL<=0.005 MIU/L-ACNC: 1.71 MCU/ML (ref 0.27–4.2)
WBC # BLD AUTO: 6.1 K/UL (ref 4–11)

## 2021-08-30 ENCOUNTER — OFFICE VISIT (OUTPATIENT)
Dept: FAMILY MEDICINE CLINIC | Age: 41
End: 2021-08-30
Payer: MEDICAID

## 2021-08-30 VITALS
SYSTOLIC BLOOD PRESSURE: 129 MMHG | TEMPERATURE: 98.3 F | WEIGHT: 269.2 LBS | DIASTOLIC BLOOD PRESSURE: 75 MMHG | HEART RATE: 78 BPM | BODY MASS INDEX: 37.55 KG/M2 | OXYGEN SATURATION: 96 % | RESPIRATION RATE: 20 BRPM

## 2021-08-30 DIAGNOSIS — R25.2 MUSCLE CRAMPS: ICD-10-CM

## 2021-08-30 DIAGNOSIS — R74.8 ELEVATED LIVER ENZYMES: Primary | ICD-10-CM

## 2021-08-30 PROCEDURE — 99213 OFFICE O/P EST LOW 20 MIN: CPT | Performed by: STUDENT IN AN ORGANIZED HEALTH CARE EDUCATION/TRAINING PROGRAM

## 2021-08-30 NOTE — PROGRESS NOTES
Lorie Echols presents today for   Chief Complaint   Patient presents with    Follow-up    Results       Is someone accompanying this pt? no    Is the patient using any DME equipment during OV? no    Depression Screening:  3 most recent PHQ Screens 8/30/2021   Little interest or pleasure in doing things Not at all   Feeling down, depressed, irritable, or hopeless Not at all   Total Score PHQ 2 0       Learning Assessment:  No flowsheet data found. Abuse Screening:  Abuse Screening Questionnaire 7/27/2021   Do you ever feel afraid of your partner? N   Are you in a relationship with someone who physically or mentally threatens you? N   Is it safe for you to go home? Y       Fall Risk  No flowsheet data found. Health Maintenance reviewed and discussed and ordered per Provider. Health Maintenance Due   Topic Date Due    Hepatitis C Screening  Never done    COVID-19 Vaccine (1) Never done   . Coordination of Care:  1. Have you been to the ER, urgent care clinic since your last visit? Hospitalized since your last visit? no    2. Have you seen or consulted any other health care providers outside of the [address] [place] [address] since your last visit? Include any pap smears or colon screening.  no      Last  Checked na  Last UDS Checked na  Last Pain contract signed: na

## 2021-08-30 NOTE — PROGRESS NOTES
Ky Kimbrough is a 41 y.o.  male and presents with    Chief Complaint   Patient presents with    Follow-up    Results           Subjective:    Patient states that he has been trying to improve his sleep pattern. Is here today to discuss the lab results. Also he states that muscle cramps in his back have gotten better since he is has adjusted his posture. Patient Active Problem List   Diagnosis Code    Pneumonia due to COVID-19 virus U07.1, J12.82    [facility] respiratory failure ([facility] J96.00      No past medical history on file. No past surgical history on file. Family History   Problem Relation Age of Onset    Heart Attack Father 61    Hypertension Brother      Social History     Socioeconomic History    Marital status:      Spouse name: Not on file    Number of children: Not on file    Years of education: Not on file    Highest education level: Not on file   Occupational History    Not on file   Tobacco Use    Smoking status: Former Smoker     Quit date: 2019     Years since quittin.6    Smokeless tobacco: Never Used   Substance and Sexual Activity    Alcohol use: Yes     Alcohol/week: 6.0 - 8.0 standard drinks     Types: 1 - 2 Glasses of wine, 5 - 6 Shots of liquor per week    Drug use: Never    Sexual activity: Not on file   Other Topics Concern    Not on file   Social History Narrative    Not on file     Social Determinants of Health     Financial Resource Strain:     Difficulty of Paying Living Expenses:    Food Insecurity:     Worried About Running Out of Food in the Last Year:     [address] in the Last Year:    Transportation Needs:     Lack of Transportation (Medical):      Lack of Transportation (Non-Medical):    Physical Activity:     Days of Exercise per Week:     Minutes of Exercise per Session:    Stress:     Feeling of Stress :    Social Connections:     Frequency of Communication with Friends and Family:     Frequency of Social Gatherings with Friends and Family:     Attends Latter-day Services:     Active Member of Clubs or Organizations:     Attends Club or Organization Meetings:     Marital Status:    Intimate Partner Violence:     Fear of Current or Ex-Partner:     Emotionally Abused:     Physically Abused:     Sexually Abused:         Current Outpatient Medications   Medication Sig Dispense Refill    cyclobenzaprine (FLEXERIL) 10 mg tablet Take 1 Tablet by mouth three (3) times daily as needed for Muscle Spasm(s). (Patient not taking: Reported on 8/30/2021) 15 Tablet 0    ferrous sulfate (IRON) 325 mg (65 mg iron) EC tablet Take 1 Tab by mouth three (3) times daily (with meals). (Patient not taking: Reported on 8/30/2021) 100 Tab 12        ROS   Review of Systems   Constitutional: Negative for chills, fever and malaise/fatigue. HENT: Negative for congestion, ear discharge and ear pain. Eyes: Negative for blurred vision, pain and discharge. Respiratory: Negative for cough and shortness of breath. Cardiovascular: Negative for chest pain and palpitations. Gastrointestinal: Negative for abdominal pain, nausea and vomiting. Genitourinary: Negative for dysuria, frequency and urgency. Skin: Negative for itching and rash. Neurological: Negative for dizziness, seizures, loss of consciousness and headaches. Psychiatric/Behavioral: Negative for substance abuse. Objective:  Vitals:    08/30/21 1636   BP: 129/75   Pulse: 78   Resp: 20   Temp: 98.3 °F (36.8 °C)   SpO2: 96%   Weight: 269 lb 3.2 oz (122.1 kg)   PainSc:   6   PainLoc: Abdomen       Physical Exam  Vitals reviewed. Constitutional:       General: He is not in acute distress. Appearance: Normal appearance. He is not ill-appearing. HENT:      Nose: Nose normal. No congestion or rhinorrhea. Eyes:      General:         Right eye: No discharge. Left eye: No discharge.       Conjunctiva/sclera: Conjunctivae normal.   Cardiovascular: Rate and Rhythm: Normal rate and regular rhythm. Pulses: Normal pulses. Pulmonary:      Effort: Pulmonary effort is normal.      Breath sounds: Normal breath sounds. Musculoskeletal:      Cervical back: Normal range of motion and neck supple. Neurological:      Mental Status: He is alert and oriented to person, place, and time. Psychiatric:         Mood and Affect: Mood normal.         Behavior: Behavior normal.         Thought Content: Thought content normal.         Judgment: Judgment normal.           LABS     TESTS      Assessment/Plan:    1. Elevated liver enzymes  Discussed that this could possibly mean patient has a fatty liver. Discussed with patient the possibility of exercise and healthier eating and improve this. Can be tested again in about 6 months. 2. Muscle cramps  This have improved, patient states that he does not need any muscle relaxants at this time. Lab review: labs reviewed, I note that liver functions normal, mildly abnormal but acceptable      I have discussed the diagnosis with the patient and the intended plan as seen in the above orders. The patient has received an after-visit summary and questions were answered concerning future plans. I have discussed medication side effects and warnings with the patient as well. I have reviewed the plan of care with the patient, accepted their input and they are in agreement with the treatment goals.          Tobin Lau MD

## 2021-09-22 ENCOUNTER — HOSPITAL ENCOUNTER (OUTPATIENT)
Dept: LAB | Age: 41
Discharge: HOME | End: 2021-09-22

## 2021-09-22 LAB — SENTARA SPECIMEN COL,SENBCF: NORMAL

## 2021-09-22 PROCEDURE — 99001 SPECIMEN HANDLING PT-LAB: CPT

## 2021-09-23 LAB
A-G RATIO,AGRAT: 1.5 RATIO (ref 1.1–2.6)
ALBUMIN SERPL-MCNC: 4.3 G/DL (ref 3.5–5)
ALP SERPL-CCNC: 67 U/L (ref 25–115)
ALT SERPL-CCNC: 75 U/L (ref 5–40)
AST SERPL W P-5'-P-CCNC: 37 U/L (ref 10–37)
BILIRUB SERPL-MCNC: 0.7 MG/DL (ref 0.2–1.2)
BILIRUBIN, DIRECT,CBIL: 0.2 MG/DL (ref 0–0.3)
GLOBULIN,GLOB: 2.8 G/DL (ref 2–4)
PROT SERPL-MCNC: 7.1 G/DL (ref 6.4–8.3)

## 2022-01-18 DIAGNOSIS — D64.9 ANEMIA, UNSPECIFIED TYPE: ICD-10-CM

## 2022-01-18 DIAGNOSIS — R25.2 MUSCLE CRAMPS: ICD-10-CM

## 2022-02-03 DIAGNOSIS — R25.2 MUSCLE CRAMPS: ICD-10-CM

## 2022-03-19 PROBLEM — J96.00 ACUTE RESPIRATORY FAILURE (HCC): Status: ACTIVE | Noted: 2020-12-05

## 2022-03-19 PROBLEM — U07.1 PNEUMONIA DUE TO COVID-19 VIRUS: Status: ACTIVE | Noted: 2020-12-05

## 2022-03-19 PROBLEM — J12.82 PNEUMONIA DUE TO COVID-19 VIRUS: Status: ACTIVE | Noted: 2020-12-05

## 2022-04-11 ENCOUNTER — OFFICE VISIT (OUTPATIENT)
Dept: FAMILY MEDICINE CLINIC | Age: 42
End: 2022-04-11
Payer: MEDICAID

## 2022-04-11 VITALS
HEART RATE: 79 BPM | OXYGEN SATURATION: 96 % | HEIGHT: 71 IN | WEIGHT: 277 LBS | DIASTOLIC BLOOD PRESSURE: 73 MMHG | SYSTOLIC BLOOD PRESSURE: 129 MMHG | BODY MASS INDEX: 38.78 KG/M2 | RESPIRATION RATE: 17 BRPM | TEMPERATURE: 98.1 F

## 2022-04-11 DIAGNOSIS — M54.6 CHRONIC LEFT-SIDED THORACIC BACK PAIN: ICD-10-CM

## 2022-04-11 DIAGNOSIS — E66.09 CLASS 2 OBESITY DUE TO EXCESS CALORIES WITHOUT SERIOUS COMORBIDITY WITH BODY MASS INDEX (BMI) OF 38.0 TO 38.9 IN ADULT: ICD-10-CM

## 2022-04-11 DIAGNOSIS — G89.29 CHRONIC LEFT-SIDED THORACIC BACK PAIN: ICD-10-CM

## 2022-04-11 DIAGNOSIS — F51.01 PRIMARY INSOMNIA: ICD-10-CM

## 2022-04-11 DIAGNOSIS — G47.33 OBSTRUCTIVE SLEEP APNEA SYNDROME: ICD-10-CM

## 2022-04-11 DIAGNOSIS — Z00.00 ANNUAL PHYSICAL EXAM: Primary | ICD-10-CM

## 2022-04-11 DIAGNOSIS — F10.10 ENGAGES IN BINGE CONSUMPTION OF ALCOHOL: ICD-10-CM

## 2022-04-11 DIAGNOSIS — Z86.16 HISTORY OF 2019 NOVEL CORONAVIRUS DISEASE (COVID-19): ICD-10-CM

## 2022-04-11 PROCEDURE — 99396 PREV VISIT EST AGE 40-64: CPT | Performed by: FAMILY MEDICINE

## 2022-04-11 RX ORDER — MELATONIN 10 MG
10 CAPSULE ORAL
Qty: 100 CAPSULE | Refills: 3 | Status: SHIPPED | OUTPATIENT
Start: 2022-04-11

## 2022-04-11 RX ORDER — MELOXICAM 7.5 MG/1
7.5 TABLET ORAL DAILY
Qty: 30 TABLET | Refills: 11 | Status: SHIPPED | OUTPATIENT
Start: 2022-04-11 | End: 2022-07-12 | Stop reason: SDUPTHER

## 2022-04-11 NOTE — PROGRESS NOTES
Navya Amor is a 35 y.o.  male and presents with    Chief Complaint   Patient presents with    Annual Exam    Back Pain       Subjective: Well Adult Physical   Patient here for a comprehensive physical exam.The patient reports problems - back pain and possible sleep apnea  Do you take any herbs or supplements that were not prescribed by a doctor? no Are you taking calcium supplements? no Are you taking aspirin daily? not applicable    Osteoarthritis and Chronic Pain:  Patient has osteoarthritis, primarily affecting the back. Symptoms onset: problem is longstanding. Rheumatological ROS: ongoing significant pain in back which is stable and controlled by PRN meds. Response to treatment plan: symptoms have progressed to a point and plateaued. Thresa Abed ROS   General ROS: negative for - chills, fatigue or fever  Psychological ROS: negative for - anxiety or depression  Ophthalmic ROS: negative for - blurry vision  ENT ROS: negative for - headaches, nasal congestion or sore throat  Endocrine ROS: negative for - polydipsia/polyuria or skin changes  Respiratory ROS: positive for - shortness of breath at night; he has been referred for sleep apnea in the past  Cardiovascular ROS: no chest pain or dyspnea on exertion  Gastrointestinal ROS: no abdominal pain, change in bowel habits, or black or bloody stools  Genito-Urinary ROS: no dysuria, trouble voiding, or hematuria  Musculoskeletal ROS: positive for - pain in back - left  Neurological ROS: negative for - numbness/tingling or weakness  Dermatological ROS: negative for - rash or skin lesion changes    All other systems reviewed and are negative.       Objective:    Visit Vitals  /73 (BP 1 Location: Right upper arm, BP Patient Position: Sitting, BP Cuff Size: Adult)   Pulse 79   Temp 98.1 °F (36.7 °C) (Temporal)   Resp 17   Ht 5' 11\" (1.803 m)   Wt 277 lb (125.6 kg)   SpO2 96%   BMI 38.63 kg/m²       General appearance  alert, cooperative, no distress, appears stated age   Head  Normocephalic, without obvious abnormality, atraumatic   Eyes  conjunctivae/corneas clear. PERRL, EOM's intact. Ears  normal TM's and external ear canals AU   Nose Nares normal. Septum midline. Mucosa normal. No drainage or sinus tenderness. Throat Lips, mucosa, and tongue normal. Teeth and gums normal   Neck supple, symmetrical, trachea midline, no adenopathy, thyroid: not enlarged, symmetric, no tenderness/mass/nodules and no JVD   Back   symmetric, no curvature. ROM normal. No CVA tenderness   Lungs   clear to auscultation bilaterally   Chest wall  tenderness left posterior to axillary line   Heart  regular rate and rhythm, S1, S2 normal, no murmur, click, rub or gallop   Abdomen   soft, non-tender. Bowel sounds normal. No masses,  No organomegaly   Genitalia  deferred   Rectal  deferred   Extremities extremities normal, atraumatic, no cyanosis or edema   Pulses 2+ and symmetric   Skin Skin color, texture, turgor normal. No rashes or lesions   Lymph nodes Cervical, supraclavicular, and axillary nodes normal.   Neurologic Normal       LABS     TESTS      Assessment/Plan:    1. Annual physical exam  Reviewed preventive recommendations    2. History of 2019 novel coronavirus disease (COVID-19)      3. Class 2 obesity due to excess calories without serious comorbidity with body mass index (BMI) of 38.0 to 38.9 in adult  I have reviewed/discussed the above normal BMI with the patient. I have recommended the following interventions: dietary management education, guidance, and counseling . [place] [place] 4. Primary insomnia  Increase sleep aid dose  - melatonin 10 mg capsule; Take 1 Capsule by mouth nightly. Dispense: 100 Capsule; Refill: 3    5. Chronic left-sided thoracic back pain  Start cope 2 inhibitor; demonstrated stretching exercise  - meloxicam (MOBIC) 7.5 mg tablet; Take 1 Tablet by mouth daily. Dispense: 30 Tablet; Refill: 11    6.  Engages in binge consumption of alcohol      7. Obstructive sleep apnea syndrome  Refer for evaluation and treatment  - SLEEP MEDICINE REFERRAL      Lab review: labs reviewed, I note that tsh normal      I have discussed the diagnosis with the patient and the intended plan as seen in the above orders. The patient has received an after-visit summary and questions were answered concerning future plans. I have discussed medication side effects and warnings with the patient as well. I have reviewed the plan of care with the patient, accepted their input and they are in agreement with the treatment goals.

## 2022-04-11 NOTE — PROGRESS NOTES
Sadie Helm is a 35 y.o. male (: 1980) presenting to address:    Chief Complaint   Patient presents with    Results    Back Pain   no covid shots    There were no vitals filed for this visit. Hearing/Vision:   No exam data present    Learning Assessment:   No flowsheet data found. Depression Screening:     3 most recent PHQ Screens 2022   Little interest or pleasure in doing things Not at all   Feeling down, depressed, irritable, or hopeless Not at all   Total Score PHQ 2 0     Fall Risk Assessment:   No flowsheet data found. Abuse Screening:     Abuse Screening Questionnaire 2021   Do you ever feel afraid of your partner? N   Are you in a relationship with someone who physically or mentally threatens you? N   Is it safe for you to go home? Y     ADL Assessment:   No flowsheet data found. Coordination of Care Questionaire:   1. Have you been to the ER, urgent care clinic since your last visit? Hospitalized since your last visit? NO    2. Have you seen or consulted any other health care providers outside of the [address] [place] [address] since your last visit? Include any pap smears or colon screening.  NO

## 2022-07-12 ENCOUNTER — OFFICE VISIT (OUTPATIENT)
Dept: FAMILY MEDICINE CLINIC | Age: 42
End: 2022-07-12
Payer: MEDICAID

## 2022-07-12 VITALS
SYSTOLIC BLOOD PRESSURE: 123 MMHG | BODY MASS INDEX: 38.14 KG/M2 | HEART RATE: 69 BPM | TEMPERATURE: 98 F | OXYGEN SATURATION: 98 % | HEIGHT: 71 IN | RESPIRATION RATE: 16 BRPM | WEIGHT: 272.4 LBS | DIASTOLIC BLOOD PRESSURE: 83 MMHG

## 2022-07-12 DIAGNOSIS — M54.6 CHRONIC LEFT-SIDED THORACIC BACK PAIN: ICD-10-CM

## 2022-07-12 DIAGNOSIS — F51.01 PRIMARY INSOMNIA: ICD-10-CM

## 2022-07-12 DIAGNOSIS — G89.29 CHRONIC LEFT-SIDED THORACIC BACK PAIN: ICD-10-CM

## 2022-07-12 DIAGNOSIS — J40 BRONCHITIS: Primary | ICD-10-CM

## 2022-07-12 DIAGNOSIS — R74.8 ELEVATED LIVER ENZYMES: ICD-10-CM

## 2022-07-12 PROCEDURE — 99214 OFFICE O/P EST MOD 30 MIN: CPT | Performed by: STUDENT IN AN ORGANIZED HEALTH CARE EDUCATION/TRAINING PROGRAM

## 2022-07-12 RX ORDER — AMOXICILLIN AND CLAVULANATE POTASSIUM 875; 125 MG/1; MG/1
1 TABLET, FILM COATED ORAL EVERY 12 HOURS
Qty: 14 TABLET | Refills: 0 | Status: SHIPPED | OUTPATIENT
Start: 2022-07-12 | End: 2022-07-19

## 2022-07-12 RX ORDER — ALBUTEROL SULFATE 90 UG/1
1 AEROSOL, METERED RESPIRATORY (INHALATION)
Qty: 18 G | Refills: 1 | Status: SHIPPED | OUTPATIENT
Start: 2022-07-12

## 2022-07-12 RX ORDER — MELOXICAM 7.5 MG/1
7.5 TABLET ORAL DAILY
Qty: 30 TABLET | Refills: 1 | Status: SHIPPED | OUTPATIENT
Start: 2022-07-12

## 2022-07-12 NOTE — PROGRESS NOTES
Clifford Jose is a 35 y.o. presents today for   Chief Complaint   Patient presents with    Sleep Problem     apnea, missed sleep study before for 2 years     Cough     w/expectoration (white thick mucus) for 2 weeks     Back Pain     mostly left side intermitent      Is someone accompanying this pt? No    Is the patient using any DME equipment during OV? No    Visit Vitals  /83 (BP 1 Location: Left upper arm, BP Patient Position: Sitting, BP Cuff Size: Adult)   Pulse 69   Temp 98 °F (36.7 °C) (Temporal)   Resp 16   Ht 5' 11\" (1.803 m)   Wt 272 lb 6.4 oz (123.6 kg)   SpO2 98%   BMI 37.99 kg/m²       Depression Screening:   3 most recent PHQ Screens 7/12/2022   Little interest or pleasure in doing things Not at all   Feeling down, depressed, irritable, or hopeless Several days   Total Score PHQ 2 1       Health Maintenance: reviewed and discussed and ordered per Provider. Health Maintenance Due   Topic Date Due    Hepatitis C Screening  Never done    Pneumococcal 0-64 years (1 - PCV) Never done         Coordination of Care:   1. \"Have you been to the ER, urgent care clinic since your last visit? Hospitalized since your last visit? \" Yes ER [facility] for cough/congestion last week    2. \"Have you seen or consulted any other health care providers outside of the [address] [place] [address] since your last visit? \" No     3. For patients aged 39-70: Has the patient had a colonoscopy / FIT/ Cologuard? NA - based on age    If the patient is female:    4. For patients aged 41-77: Has the patient had a mammogram within the past 2 years? NA - based on age or sex    11. For patients aged 21-65: Has the patient had a pap smear? NA - based on age or sex     Advanced Directive:  1. Do you have an Advanced Directive? No     2. Would you like information on Advanced Directives?  No    Janee Comer CMA

## 2022-07-12 NOTE — PATIENT INSTRUCTIONS
Insomnia: Care Instructions  Overview     Insomnia is the inability to sleep well. Insomnia may make it hard for you to get to sleep, stay asleep, or sleep as long as you need to. This can make you tired and grouchy during the day. It can also make you forgetful, less effective at work, and unhappy. Insomnia can be linked to many things. These include health problems, medicines, and stressful events. Treatment may include treating problems that may be linked with your insomnia. Treatment also includes behavior and lifestyle changes. This may include cognitive-behavioral therapy for insomnia (CBT-I). CBT-I uses different ways to help you change your thoughts and behaviors that may interfere with sleep. Your doctor can recommend specific things you can try. Examples include doing relaxation exercises, keeping regular bedtimes and wake times, limiting alcohol, and making healthy sleep habits. Some people decide to take medicine for a while to help with sleep. Follow-up care is a key part of your treatment and safety. Be sure to make and go to all appointments, and call your doctor if you are having problems. It's also a good idea to know your test results and keep a list of the medicines you take. How can you care for yourself at home? Cognitive-behavioral therapy for insomnia (CBT-I)  · If your doctor recommends CBT-I, follow your treatment plan. Your doctor will give you instructions that are unique for you. · Your plan will likely include a few things that you can try at home. For example:  ? Try meditation or other relaxation techniques before you go to bed. ? Go to bed at the same time every night, and wake up at the same time every morning. Do not take naps during the day. ? Do not stay in bed awake for too long.  If you can't fall asleep, or if you wake up in the middle of the night and can't get back to sleep within about 15 to 20 minutes, get out of bed and go to another room until you feel sleepy. ? If watching the clock makes you anxious, turn it facing away from you so you cannot see the time. ? If you worry when you lie down, start a worry book. Well before bedtime, write down your worries, and then set the book and your concerns aside. Healthy sleep habits  · If your doctor recommends it, try making healthy sleep habits. For example:  ? Keep your bedroom quiet, dark, and cool. ? Do not have drinks with caffeine, such as coffee or black tea, for 8 hours before bed. ? Do not smoke or use other types of tobacco near bedtime. Nicotine is a stimulant and can keep you awake. ? Avoid drinking alcohol late in the evening, because it can cause you to wake in the middle of the night. ? Do not eat a big meal close to bedtime. If you are hungry, eat a light snack. ? Do not drink a lot of water close to bedtime, because the need to urinate may wake you up during the night. ? Do not read, watch TV, or use your phone in bed. Use the bed only for sleeping and sex. Medicine  · Be safe with medicines. Take your medicines exactly as prescribed. Call your doctor if you think you are having a problem with your medicine. · Talk with your doctor before you try an over-the-counter medicine, herbal product, or supplement to try to improve your sleep. Your doctor can recommend how much to take and when to take it. Make sure your doctor knows all of the medicines, vitamins, herbal products, and supplements you take. · You will get more details on the specific medicines your doctor prescribes. When should you call for help? Watch closely for changes in your health, and be sure to contact your doctor if:    · Your efforts to improve your sleep do not work.     · Your insomnia gets worse.     · You have been feeling down, depressed, or hopeless or have lost interest in things that you usually enjoy. Where can you learn more?   Go to http://www.gray.com/  Enter P513 in the search box to learn more about \"Insomnia: Care Instructions. \"  Current as of: June 16, 2021               Content Version: 13.2  © 7687-3912 [employer]. Care instructions adapted under license by [employer] (which disclaims liability or warranty for this information). If you have questions about a medical condition or this instruction, always ask your healthcare professional. [address] any warranty or liability for your use of this information.

## 2022-07-12 NOTE — PROGRESS NOTES
Bladimir Butts is a 35 y.o.  male and presents with    Chief Complaint   Patient presents with    Sleep Problem     apnea, missed sleep study before for 2 years     Cough     w/expectoration (white thick mucus) for 2 weeks     Back Pain     mostly left side intermitent            Subjective: For the last 2 weeks he has been feeling unwell. Started feeling with a cough that would not go away. Then would wake up in the middle of the night feeling choked. Went to the ER about last week was told negative for covid and flu. Was given prescription for OTC Robitussin. Coughing so much he had rib and chest pain. Has had expectoration where yellow and have been white now. No fever, no nausea or vomiting. Wife noted in the middle of the night like he would stop breathing. Used to snore at night and only snores when he drinks alcohol. Has lost some weight. Having back pain. Took Mobic seemed to help. Muscle relaxants made him feel sleepy. Patient Active Problem List   Diagnosis Code    Pneumonia due to COVID-19 virus U07.1, J12.82    [facility] J96.00      History reviewed. No pertinent past medical history. History reviewed. No pertinent surgical history. Family History   Problem Relation Age of Onset    Heart Attack Father 61    Hypertension Brother      Social History     Socioeconomic History    Marital status:      Spouse name: Not on file    Number of children: Not on file    Years of education: Not on file    Highest education level: Not on file   Occupational History    Not on file   Tobacco Use    Smoking status: Former Smoker     Quit date: 2019     Years since quitting: 3.5    Smokeless tobacco: Never Used   Substance and Sexual Activity    Alcohol use:  Yes     Alcohol/week: 6.0 - 8.0 standard drinks     Types: 1 - 2 Glasses of wine, 5 - 6 Shots of liquor per week    Drug use: Never    Sexual activity: Not on file   Other Topics Concern    Not on file   Social History Narrative    Not on file     Social Determinants of Health     Financial Resource Strain:     Difficulty of Paying Living Expenses: Not on file   Food Insecurity:     Worried About Running Out of Food in the Last Year: Not on file    Mohinder of Food in the Last Year: Not on file   Transportation Needs:     Lack of Transportation (Medical): Not on file    Lack of Transportation (Non-Medical): Not on file   Physical Activity:     Days of Exercise per Week: Not on file    Minutes of Exercise per Session: Not on file   Stress:     Feeling of Stress : Not on file   Social Connections:     Frequency of Communication with Friends and Family: Not on file    Frequency of Social Gatherings with Friends and Family: Not on file    Attends Rastafari Services: Not on file    Active Member of [address] [place] [address] or Organizations: Not on file    Attends Club or Organization Meetings: Not on file    Marital Status: Not on file   Intimate Partner Violence:     Fear of Current or Ex-Partner: Not on file    Emotionally Abused: Not on file    Physically Abused: Not on file    Sexually Abused: Not on file   Housing Stability:     Unable to Pay for Housing in the Last Year: Not on file    Number of [place] in the Last Year: Not on file    Unstable Housing in the Last Year: Not on file        Current Outpatient Medications   Medication Sig Dispense Refill    melatonin 10 mg capsule Take 1 Capsule by mouth nightly. 100 Capsule 3    meloxicam (MOBIC) 7.5 mg tablet Take 1 Tablet by mouth daily. (Patient not taking: Reported on 7/12/2022) 30 Tablet 11        ROS   Review of Systems   Constitutional: Negative for chills, fever and malaise/fatigue. HENT: Negative for congestion, ear discharge and ear pain. Eyes: Negative for blurred vision, pain and discharge. Respiratory: Negative for cough and shortness of breath. Cardiovascular: Negative for chest pain and palpitations. Gastrointestinal: Negative for abdominal pain, nausea and vomiting. Genitourinary: Negative for dysuria, frequency and urgency. Skin: Negative for itching and rash. Neurological: Negative for dizziness, seizures, loss of consciousness and headaches. Psychiatric/Behavioral: Negative for substance abuse. Objective:  Vitals:    07/12/22 0843   BP: 123/83   Pulse: 69   Resp: 16   Temp: 98 °F (36.7 °C)   TempSrc: Temporal   SpO2: 98%   Weight: 272 lb 6.4 oz (123.6 kg)   Height: 5' 11\" (1.803 m)   PainSc:   0 - No pain       Physical Exam  Constitutional:       General: He is not in acute distress. Appearance: Normal appearance. He is not ill-appearing. HENT:      Right Ear: Tympanic membrane, ear canal and external ear normal.      Left Ear: Tympanic membrane, ear canal and external ear normal.      Nose: Nose normal. No congestion or rhinorrhea. Mouth/Throat:      Mouth: Mucous membranes are moist.      Pharynx: Oropharynx is clear. Posterior oropharyngeal erythema present. Eyes:      General:         Right eye: No discharge. Left eye: No discharge. Conjunctiva/sclera: Conjunctivae normal.      Pupils: Pupils are equal, round, and reactive to light. Cardiovascular:      Rate and Rhythm: Normal rate and regular rhythm. Pulmonary:      Effort: Pulmonary effort is normal.      Breath sounds: Wheezing present. Musculoskeletal:      Cervical back: Normal range of motion and neck supple. Skin:     General: Skin is warm. Neurological:      Mental Status: He is alert and oriented to person, place, and time. Psychiatric:         Mood and Affect: Mood normal.         Behavior: Behavior normal.         Thought Content: Thought content normal.         Judgment: Judgment normal.           LABS     TESTS      Assessment/Plan:    1. Bronchitis  - albuterol (PROVENTIL HFA, VENTOLIN HFA, PROAIR HFA) 90 mcg/actuation inhaler;  Take 1 Puff by inhalation every four (4) hours as needed for Wheezing. Dispense: 18 g; Refill: 1  - amoxicillin-clavulanate (AUGMENTIN) 875-125 mg per tablet; Take 1 Tablet by mouth every twelve (12) hours for 7 days. Dispense: 14 Tablet; Refill: 0    2. Primary insomnia  Sleep hygiene    3. Chronic left-sided thoracic back pain  - meloxicam (MOBIC) 7.5 mg tablet; Take 1 Tablet by mouth daily. Dispense: 30 Tablet; Refill: 1    4. Elevated liver enzymes  Labs to be done prior to next appt  - METABOLIC PANEL, COMPREHENSIVE; Future  - CBC WITH AUTOMATED DIFF; Future  - LIPID PANEL; Future      Lab review: no lab studies available for review at time of visit      I have discussed the diagnosis with the patient and the intended plan as seen in the above orders. The patient has received an after-visit summary and questions were answered concerning future plans. I have discussed medication side effects and warnings with the patient as well. I have reviewed the plan of care with the patient, accepted their input and they are in agreement with the treatment goals.          Enrique Parekh MD

## 2022-10-06 ENCOUNTER — HOSPITAL ENCOUNTER (OUTPATIENT)
Dept: LAB | Age: 42
Discharge: HOME | End: 2022-10-06

## 2022-10-06 LAB — SENTARA SPECIMEN COL,SENBCF: NORMAL

## 2022-10-06 PROCEDURE — 99001 SPECIMEN HANDLING PT-LAB: CPT

## 2022-10-07 LAB
A-G RATIO,AGRAT: 1.7 RATIO (ref 1.1–2.6)
ABSOLUTE LYMPHOCYTE COUNT, 10803: 2 K/UL (ref 1–4.8)
ALBUMIN SERPL-MCNC: 4.3 G/DL (ref 3.5–5)
ALP SERPL-CCNC: 62 U/L (ref 25–115)
ALT SERPL-CCNC: 37 U/L (ref 5–40)
ANION GAP SERPL CALC-SCNC: 11 MMOL/L (ref 3–15)
AST SERPL W P-5'-P-CCNC: 22 U/L (ref 10–37)
BASOPHILS # BLD: 0 K/UL (ref 0–0.2)
BASOPHILS NFR BLD: 1 % (ref 0–2)
BILIRUB SERPL-MCNC: 0.7 MG/DL (ref 0.2–1.2)
BUN SERPL-MCNC: 12 MG/DL (ref 6–22)
CALCIUM SERPL-MCNC: 9.1 MG/DL (ref 8.4–10.5)
CHLORIDE SERPL-SCNC: 106 MMOL/L (ref 98–110)
CHOLEST SERPL-MCNC: 116 MG/DL (ref 110–200)
CO2 SERPL-SCNC: 22 MMOL/L (ref 20–32)
CREAT SERPL-MCNC: 1.2 MG/DL (ref 0.5–1.2)
EOSINOPHIL # BLD: 0.1 K/UL (ref 0–0.5)
EOSINOPHIL NFR BLD: 2 % (ref 0–6)
ERYTHROCYTE [DISTWIDTH] IN BLOOD BY AUTOMATED COUNT: 11.9 % (ref 10–15.5)
GLOBULIN,GLOB: 2.5 G/DL (ref 2–4)
GLOMERULAR FILTRATION RATE: >60 ML/MIN/1.73 SQ.M.
GLUCOSE SERPL-MCNC: 96 MG/DL (ref 70–99)
GRANULOCYTES,GRANS: 52 % (ref 40–75)
HCT VFR BLD AUTO: 44.9 % (ref 36.6–51.9)
HDLC SERPL-MCNC: 2.9 MG/DL (ref 0–5)
HDLC SERPL-MCNC: 40 MG/DL
HGB BLD-MCNC: 13.9 G/DL (ref 13.2–17.3)
LDL/HDL RATIO,LDHD: 1.4
LDLC SERPL CALC-MCNC: 54 MG/DL (ref 50–99)
LYMPHOCYTES, LYMLT: 35 % (ref 20–45)
MCH RBC QN AUTO: 29 PG (ref 26–34)
MCHC RBC AUTO-ENTMCNC: 31 G/DL (ref 31–36)
MCV RBC AUTO: 92 FL (ref 80–95)
MONOCYTES # BLD: 0.6 K/UL (ref 0.1–1)
MONOCYTES NFR BLD: 10 % (ref 3–12)
NEUTROPHILS # BLD AUTO: 2.9 K/UL (ref 1.8–7.7)
NON-HDL CHOLESTEROL, 011976: 76 MG/DL
PLATELET # BLD AUTO: 286 K/UL (ref 140–440)
PMV BLD AUTO: 12.6 FL (ref 9–13)
POTASSIUM SERPL-SCNC: 4.5 MMOL/L (ref 3.5–5.5)
PROT SERPL-MCNC: 6.8 G/DL (ref 6.4–8.3)
RBC # BLD AUTO: 4.87 M/UL (ref 3.8–5.8)
SODIUM SERPL-SCNC: 139 MMOL/L (ref 133–145)
TRIGL SERPL-MCNC: 108 MG/DL (ref 40–149)
TSH SERPL DL<=0.005 MIU/L-ACNC: 1.42 MCU/ML (ref 0.27–4.2)
VLDLC SERPL CALC-MCNC: 22 MG/DL (ref 8–30)
WBC # BLD AUTO: 5.6 K/UL (ref 4–11)

## 2022-10-13 ENCOUNTER — OFFICE VISIT (OUTPATIENT)
Dept: FAMILY MEDICINE CLINIC | Age: 42
End: 2022-10-13
Payer: MEDICAID

## 2022-10-13 VITALS
TEMPERATURE: 98.1 F | BODY MASS INDEX: 37.22 KG/M2 | SYSTOLIC BLOOD PRESSURE: 121 MMHG | HEIGHT: 72 IN | OXYGEN SATURATION: 97 % | DIASTOLIC BLOOD PRESSURE: 80 MMHG | RESPIRATION RATE: 20 BRPM | WEIGHT: 274.8 LBS | HEART RATE: 72 BPM

## 2022-10-13 DIAGNOSIS — M25.562 CHRONIC PAIN OF BOTH KNEES: Primary | ICD-10-CM

## 2022-10-13 DIAGNOSIS — M25.561 CHRONIC PAIN OF BOTH KNEES: Primary | ICD-10-CM

## 2022-10-13 DIAGNOSIS — G89.29 CHRONIC PAIN OF BOTH KNEES: Primary | ICD-10-CM

## 2022-10-13 DIAGNOSIS — R12 HEARTBURN: ICD-10-CM

## 2022-10-13 PROCEDURE — 99213 OFFICE O/P EST LOW 20 MIN: CPT | Performed by: STUDENT IN AN ORGANIZED HEALTH CARE EDUCATION/TRAINING PROGRAM

## 2022-10-13 RX ORDER — OMEPRAZOLE 40 MG/1
40 CAPSULE, DELAYED RELEASE ORAL DAILY
Qty: 90 CAPSULE | Refills: 0 | Status: SHIPPED | OUTPATIENT
Start: 2022-10-13

## 2022-10-13 NOTE — PROGRESS NOTES
Noé Mcneill presents today for   Chief Complaint   Patient presents with    Knee Pain    Follow-up       Is someone accompanying this pt? no    Is the patient using any DME equipment during OV? no    Depression Screening:  3 most recent PHQ Screens 10/13/2022   Little interest or pleasure in doing things Not at all   Feeling down, depressed, irritable, or hopeless Not at all   Total Score PHQ 2 0       Learning Assessment:  No flowsheet data found. Abuse Screening:  Abuse Screening Questionnaire 7/27/2021   Do you ever feel afraid of your partner? N   Are you in a relationship with someone who physically or mentally threatens you? N   Is it safe for you to go home? Y       Fall Risk  No flowsheet data found. Health Maintenance reviewed and discussed and ordered per Provider. Health Maintenance Due   Topic Date Due    Hepatitis C Screening  Never done    Flu Vaccine (1) Never done   . 1. \"Have you been to the ER, urgent care clinic since your last visit? Hospitalized since your last visit? \" No    2. \"Have you seen or consulted any other health care providers outside of the [address] [place] [address] since your last visit? \" No     3. For patients over 45: Has the patient had a colonoscopy? No     If the patient is female:    4. For patients over 40: Has the patient had a mammogram? No    5. For patients over 21: Has the patient had a pap smear?  No
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
## 2022-10-20 ENCOUNTER — APPOINTMENT (OUTPATIENT)
Dept: PHYSICAL THERAPY | Age: 42
End: 2022-10-20
Attending: STUDENT IN AN ORGANIZED HEALTH CARE EDUCATION/TRAINING PROGRAM

## 2022-11-02 DIAGNOSIS — M25.561 CHRONIC PAIN OF BOTH KNEES: Primary | ICD-10-CM

## 2022-11-02 DIAGNOSIS — G89.29 CHRONIC PAIN OF BOTH KNEES: Primary | ICD-10-CM

## 2022-11-02 DIAGNOSIS — M25.562 CHRONIC PAIN OF BOTH KNEES: Primary | ICD-10-CM

## 2023-01-13 ENCOUNTER — OFFICE VISIT (OUTPATIENT)
Dept: FAMILY MEDICINE CLINIC | Age: 43
End: 2023-01-13
Payer: MEDICAID

## 2023-01-13 VITALS
WEIGHT: 276 LBS | BODY MASS INDEX: 37.43 KG/M2 | SYSTOLIC BLOOD PRESSURE: 127 MMHG | DIASTOLIC BLOOD PRESSURE: 79 MMHG | HEART RATE: 87 BPM | TEMPERATURE: 98.3 F | OXYGEN SATURATION: 96 % | RESPIRATION RATE: 20 BRPM

## 2023-01-13 DIAGNOSIS — J01.00 ACUTE NON-RECURRENT MAXILLARY SINUSITIS: Primary | ICD-10-CM

## 2023-01-13 RX ORDER — AMOXICILLIN AND CLAVULANATE POTASSIUM 875; 125 MG/1; MG/1
1 TABLET, FILM COATED ORAL 2 TIMES DAILY
Qty: 14 TABLET | Refills: 0 | Status: SHIPPED | OUTPATIENT
Start: 2023-01-13 | End: 2023-01-20

## 2023-01-13 NOTE — PROGRESS NOTES
Dinora Bryant presents today for No chief complaint on file. Is someone accompanying this pt? no    Is the patient using any DME equipment during [address]? no    Depression Screening:  3 most recent PHQ Screens 10/13/2022   Little interest or pleasure in doing things Not at all   Feeling down, depressed, irritable, or hopeless Not at all   Total Score PHQ 2 0       Learning Assessment:  No flowsheet data found. Abuse Screening:  Abuse Screening Questionnaire 7/27/2021   Do you ever feel afraid of your partner? N   Are you in a relationship with someone who physically or mentally threatens you? N   Is it safe for you to go home? Y       Fall Risk  No flowsheet data found. Health Maintenance reviewed and discussed and ordered per Provider. Health Maintenance Due   Topic Date Due    Hepatitis C Screening  Never done    Flu Vaccine (1) Never done   . 1. \"Have you been to the ER, urgent care clinic since your last visit? Hospitalized since your last visit? \" No    2. \"Have you seen or consulted any other health care providers outside of the [address] [place] [address] since your last visit? \" No     3. For patients over 45: Has the patient had a colonoscopy? No     If the patient is female:    4. For patients over 40: Has the patient had a mammogram? No    5. For patients over 21: Has the patient had a pap smear?  No

## 2023-01-13 NOTE — PROGRESS NOTES
Noé Mcneill is a 38 y.o.  male and presents with    No chief complaint on file. Subjective:    Has been feeling unwell for the last 3-4 days. Has been having  productive cough w/  green like color phlegm. Has been having congestions. Denies fever. Denies sinus pressure. When he moves a lot he gets SOB. Went to work last on Wednesday. Has not been to work d/t feeling unwell. Has not had alcohol since . Took Theraflu last night. Took his vitamins. Drinks a lot of taniya shots, and elderberry tea. Drinking a lot of water. Does not know if he has been around sick contacts. Has not had a COVID test.  Denies sore throat. Went ot sleep medicine. Was sent to have an MRA, but seems like he had a panic attack and he could not finish the test.   Patient Active Problem List   Diagnosis Code    Pneumonia due to COVID-19 virus U07.1, J12.82    Acute respiratory failure ([facility]) J96.00      No past medical history on file. No past surgical history on file. Family History   Problem Relation Age of Onset    Heart Attack Father 61    Hypertension Brother      Social History     Socioeconomic History    Marital status:      Spouse name: Not on file    Number of children: Not on file    Years of education: Not on file    Highest education level: Not on file   Occupational History    Not on file   Tobacco Use    Smoking status: Former     Types: Cigarettes     Quit date: 2019     Years since quittin.0    Smokeless tobacco: Never   Substance and Sexual Activity    Alcohol use:  Yes     Alcohol/week: 6.0 - 8.0 standard drinks     Types: 1 - 2 Glasses of wine, 5 - 6 Shots of liquor per week    Drug use: Never    Sexual activity: Not on file   Other Topics Concern    Not on file   Social History Narrative    Not on file     Social Determinants of Health     Financial Resource Strain: Not on file   Food Insecurity: Not on file   Transportation Needs: Not on file   Physical Activity: Not on file   Stress: Not on file   Social Connections: Not on file   Intimate Partner Violence: Not on file   Housing Stability: Not on file        Current Outpatient Medications   Medication Sig Dispense Refill    omeprazole (PRILOSEC) 40 mg capsule Take 1 Capsule by mouth daily. 90 Capsule 0    albuterol (PROVENTIL HFA, VENTOLIN HFA, PROAIR HFA) 90 mcg/actuation inhaler Take 1 Puff by inhalation every four (4) hours as needed for Wheezing. (Patient not taking: Reported on 10/13/2022) 18 g 1    meloxicam (MOBIC) 7.5 mg tablet Take 1 Tablet by mouth daily. (Patient not taking: Reported on 10/13/2022) 30 Tablet 1    melatonin 10 mg capsule Take 1 Capsule by mouth nightly. (Patient not taking: Reported on 10/13/2022) 100 Capsule 3        ROS   Review of Systems   Constitutional:  Negative for chills, fever and malaise/fatigue. HENT:  Positive for congestion. Negative for ear discharge and ear pain. Eyes:  Negative for blurred vision, pain and discharge. Respiratory:  Positive for cough and shortness of breath. Cardiovascular:  Negative for chest pain and palpitations. Gastrointestinal:  Negative for abdominal pain, nausea and vomiting. Genitourinary:  Negative for dysuria, frequency and urgency. Skin:  Negative for itching and rash. Neurological:  Negative for dizziness, seizures, loss of consciousness and headaches. Psychiatric/Behavioral:  Negative for substance abuse. Objective:  Vitals:    01/13/23 1221   BP: 127/79   Pulse: 87   Resp: 20   Temp: 98.3 °F (36.8 °C)   SpO2: 96%   Weight: 276 lb (125.2 kg)   PainSc:   8   PainLoc: Knee       Physical Exam  Constitutional:       General: He is not in acute distress. Appearance: Normal appearance. He is not ill-appearing. HENT:      Left Ear: A middle ear effusion is present. Nose: No congestion or rhinorrhea. Right Sinus: Maxillary sinus tenderness present. Left Sinus: Maxillary sinus tenderness present.    Eyes: General:         Right eye: No discharge. Left eye: No discharge. Conjunctiva/sclera: Conjunctivae normal.   Cardiovascular:      Rate and Rhythm: Normal rate and regular rhythm. Pulmonary:      Effort: Pulmonary effort is normal.      Breath sounds: Normal breath sounds. Musculoskeletal:      Cervical back: Normal range of motion and neck supple. Neurological:      Mental Status: He is alert and oriented to person, place, and time. Psychiatric:         Mood and Affect: Mood normal.         Behavior: Behavior normal.         Thought Content: Thought content normal.         Judgment: Judgment normal.         LABS     TESTS      Assessment/Plan:    1. Acute non-recurrent maxillary sinusitis  - amoxicillin-clavulanate (AUGMENTIN) 875-125 mg per tablet; Take 1 Tablet by mouth two (2) times a day for 7 days. Dispense: 14 Tablet; Refill: 0      Lab review: no lab studies available for review at time of visit      I have discussed the diagnosis with the patient and the intended plan as seen in the above orders. The patient has received an after-visit summary and questions were answered concerning future plans. I have discussed medication side effects and warnings with the patient as well. I have reviewed the plan of care with the patient, accepted their input and they are in agreement with the treatment goals.          Jesus Yoo MD

## 2023-01-24 ENCOUNTER — OFFICE VISIT (OUTPATIENT)
Dept: ORTHOPEDIC SURGERY | Age: 43
End: 2023-01-24
Payer: MEDICAID

## 2023-01-24 VITALS — WEIGHT: 275 LBS | TEMPERATURE: 97.5 F | HEIGHT: 72 IN | BODY MASS INDEX: 37.25 KG/M2

## 2023-01-24 DIAGNOSIS — E66.9 CLASS 2 OBESITY WITH BODY MASS INDEX (BMI) OF 37.0 TO 37.9 IN ADULT, UNSPECIFIED OBESITY TYPE, UNSPECIFIED WHETHER SERIOUS COMORBIDITY PRESENT: ICD-10-CM

## 2023-01-24 DIAGNOSIS — M17.0 PRIMARY OSTEOARTHRITIS OF KNEES, BILATERAL: Primary | ICD-10-CM

## 2023-01-24 PROCEDURE — 99203 OFFICE O/P NEW LOW 30 MIN: CPT | Performed by: ORTHOPAEDIC SURGERY

## 2023-01-24 PROCEDURE — 73564 X-RAY EXAM KNEE 4 OR MORE: CPT | Performed by: ORTHOPAEDIC SURGERY

## 2023-01-24 RX ORDER — DICLOFENAC SODIUM 75 MG/1
75 TABLET, DELAYED RELEASE ORAL 2 TIMES DAILY
Qty: 60 TABLET | Refills: 1 | Status: SHIPPED | OUTPATIENT
Start: 2023-01-24 | End: 2023-03-25

## 2023-01-24 NOTE — PROGRESS NOTES
Patient: Anna Connors                MRN: [id number]       SSN: [id number]  YOB: 1980        AGE: 38 y.o. SEX: male  Body mass index is 37.3 kg/m². PCP: Vivian Gregg MD  01/24/23  Occupation:   [employer]    Chief Complaint: Knee Pain (Bilateral knee pain)        ICD-10-CM ICD-9-CM    1. Primary osteoarthritis of knees, bilateral  M17.0 715.16 AMB POC XRAY, KNEE; COMPLETE, 4+ VIEW      AMB POC XRAY, KNEE; COMPLETE, 4+ VIEW      2. Class 2 obesity with body mass index (BMI) of 37.0 to 37.9 in adult, unspecified obesity type, unspecified whether serious comorbidity present  E66.9 278.00     Z68.37 V85.37           HPI: Anna Connors is a 38 y.o. male patient with Knee Pain (Bilateral knee pain)  Has been having knee pain for many years. He was previously incarcerated and injured his right knee while playing basketball. He is an avid athlete growing up and has always had trouble with his knees. He also reports he is always had \"knock knees\"    Tobacco Use: Medium Risk    Smoking Tobacco Use: Former    Smokeless Tobacco Use: Never    Passive Exposure: Not on file         PHYSICAL EXAMINATION:  Visit Vitals  Temp 97.5 °F (36.4 °C) (Temporal)   Ht 6' (1.829 m)   Wt 275 lb (124.7 kg)   BMI 37.30 kg/m²     Body mass index is 37.3 kg/m². GENERAL: Alert and oriented x3, in no acute distress. HEENT: Normocephalic, atraumatic. MSK: Right Knee Exam     Tenderness   The patient is experiencing tenderness in the medial joint line and lateral joint line. Range of Motion   Extension:  0   Flexion:  130     Tests   Varus: negative Valgus: negative    Other   Erythema: absent  Sensation: normal  Pulse: present  Swelling: mild      Left Knee Exam     Muscle Strength   The patient has normal left knee strength. Tenderness   The patient is experiencing tenderness in the lateral joint line.     Range of Motion   Extension:  0   Flexion:  130     Other   Erythema: absent  Sensation: normal  Pulse: present  Swelling: mild    Comments: There is medial and lateral joint line tenderness in the right knee but only lateral joint line tenderness in the left knee. He has full range of motion. He has valgus pseudolaxity bilaterally. IMAGING:  Imaging read by myself and interpreted as follows:  4 view x-rays of the bilateral knees including AP, lateral, sunrise and notch views demonstrates bone-on-bone articulation in the lateral joint space on tunnel view. There are large osteophytes as well as subchondral sclerosis and subchondral cyst formation. ASSESSMENT & PLAN  Diagnosis: 38 y.o. male with bilateral knee osteoarthritis primarily affecting the lateral joint line. He does have medial and lateral joint line in the right knee but only lateral joint line pain on the left knee. I discussed with the patient the natural history of arthritis and its progressive nature. We discussed conservative treatment options to include ice, rest, compression, elevation, physical therapy, activity modification, weight loss, dieting, Tylenol, NSAIDs, topicals, prescription medication, as well as touched on more invasive treatments including injection therapy and joint replacement. and At this time the patient would like to try conservative treatment at this time. I explained to them if they experience any symptoms such as catching, locking or more consistent pain that is not adequately treated by conservative measures that they should come back and we can discuss more aggressive treatment with injections. He already has physical therapy order form from his primary care physician. I will give him Voltaren gel as he says that his pain comes and goes and mostly is there when it is either cold outside or he has been particularly active.   Asked him to get physical therapy and the Concerta medication about 3 months and if he still has pain he should come back in 1 we will plan on giving him corticosteroid injections. History reviewed. No pertinent past medical history. Family History   Problem Relation Age of Onset    Heart Attack Father 61    Hypertension Brother        Current Outpatient Medications   Medication Sig Dispense Refill    omeprazole (PRILOSEC) 40 mg capsule Take 1 Capsule by mouth daily. 90 Capsule 0    albuterol (PROVENTIL HFA, VENTOLIN HFA, PROAIR HFA) 90 mcg/actuation inhaler Take 1 Puff by inhalation every four (4) hours as needed for Wheezing. (Patient not taking: Reported on 10/13/2022) 18 g 1    meloxicam (MOBIC) 7.5 mg tablet Take 1 Tablet by mouth daily. (Patient not taking: Reported on 10/13/2022) 30 Tablet 1    melatonin 10 mg capsule Take 1 Capsule by mouth nightly. (Patient not taking: Reported on 10/13/2022) 100 Capsule 3        No Known Allergies    History reviewed. No pertinent surgical history. Social History     Socioeconomic History    Marital status:      Spouse name: Not on file    Number of children: Not on file    Years of education: Not on file    Highest education level: Not on file   Occupational History    Not on file   Tobacco Use    Smoking status: Former     Types: Cigarettes     Quit date: 2019     Years since quittin.0    Smokeless tobacco: Never   Substance and Sexual Activity    Alcohol use:  Yes     Alcohol/week: 6.0 - 8.0 standard drinks     Types: 1 - 2 Glasses of wine, 5 - 6 Shots of liquor per week    Drug use: Never    Sexual activity: Not on file   Other Topics Concern    Not on file   Social History Narrative    Not on file     Social Determinants of Health     Financial Resource Strain: Not on file   Food Insecurity: Not on file   Transportation Needs: Not on file   Physical Activity: Not on file   Stress: Not on file   Social Connections: Not on file   Intimate Partner Violence: Not on file   Housing Stability: Not on file       REVIEW OF SYSTEMS:      No changes from previous review of systems unless noted.      Prescription medication management discussed with patient. Electronically signed by: Anjel Regan DO    Note: This note was completed using voice recognition software.   Any typographical/name errors or mistakes are unintentional.

## 2023-02-02 DIAGNOSIS — G89.29 CHRONIC PAIN OF BOTH KNEES: Primary | ICD-10-CM

## 2023-02-02 DIAGNOSIS — M25.562 CHRONIC PAIN OF BOTH KNEES: Primary | ICD-10-CM

## 2023-02-02 DIAGNOSIS — M25.561 CHRONIC PAIN OF BOTH KNEES: Primary | ICD-10-CM

## 2023-02-14 DIAGNOSIS — G89.29 CHRONIC PAIN OF BOTH KNEES: Primary | ICD-10-CM

## 2023-02-14 DIAGNOSIS — M25.562 CHRONIC PAIN OF BOTH KNEES: Primary | ICD-10-CM

## 2023-02-14 DIAGNOSIS — M25.561 CHRONIC PAIN OF BOTH KNEES: Primary | ICD-10-CM

## 2023-03-06 ENCOUNTER — APPOINTMENT (OUTPATIENT)
Facility: HOSPITAL | Age: 43
End: 2023-03-06
Payer: MEDICAID

## 2023-03-08 ENCOUNTER — HOSPITAL ENCOUNTER (OUTPATIENT)
Facility: HOSPITAL | Age: 43
Setting detail: RECURRING SERIES
Discharge: HOME | End: 2023-03-11
Payer: MEDICAID

## 2023-03-08 PROCEDURE — 97162 PT EVAL MOD COMPLEX 30 MIN: CPT
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
## 2023-03-08 NOTE — PROGRESS NOTES
PHYSICAL / OCCUPATIONAL THERAPY - DAILY TREATMENT NOTE (updated )    Patient Name: Aryan Crew    Date: 3/8/2023    : 1980  Insurance: Payor: Pablo Shaffer / Plan: EXP [employer]ILY [employer] / Product Type: *No Product type* /      Patient  verified Yes     Visit #   Current / Total 1 10   Time   In / Out 1:05 1:40   Pain   In / Out 8 8   Subjective Functional Status/Changes: See POC   Changes to:  Meds, Allergies, Med Hx, Sx Hx? If yes, update Summary List yes       TREATMENT AREA =  Left knee pain [M25.562]  Right knee pain [M25.561]    OBJECTIVE      Therapeutic Procedures: Tx Min Billable or 1:1 Min (if diff from Tx Min) Procedure, Rationale, Specifics   10  38743 Self Care/Home Management (timed):  improve patient knowledge and understanding of pain reducing techniques, activity modification, diagnosis/prognosis, and physical therapy expectations, procedures and progression  to improve patient's ability to progress to PLOF and address remaining functional goals.   (see flow sheet as applicable)     Details if applicable:  Edu HEP; avoidance of movements that exacerbated pain, ie. lunges            Details if applicable:            Details if applicable:            Details if applicable:            Details if applicable:     10  MC BC Totals Reminder: bill using total billable min of TIMED therapeutic procedures (example: do not include dry needle or estim unattended, both untimed codes, in totals to left)  8-22 min = 1 unit; 23-37 min = 2 units; 38-52 min = 3 units; 53-67 min = 4 units; 68-82 min = 5 units   Total Total     [x]  Patient Education billed concurrently with other procedures   [x] Review HEP    [] Progressed/Changed HEP, detail:    [] Other detail:       Objective Information/Functional Measures/Assessment    See Eval    Patient will continue to benefit from skilled PT / OT services to modify and progress therapeutic interventions, analyze and address functional mobility deficits, analyze and address ROM deficits, analyze and address strength deficits, analyze and address soft tissue restrictions, analyze and cue for proper movement patterns, and analyze and modify for postural abnormalities to address functional deficits and attain remaining goals.     Progress toward goals / Updated goals:  []  See Progress Note/Recertification    See POC    PLAN  Yes  Continue plan of care  []  Upgrade activities as tolerated  []  Discharge due to :  []  Other:    Ofelia Koenig, PT    3/8/2023    1:44 PM    Future Appointments   Date Time Provider Wong Jarvis   7/14/2023  1:20 PM Elvira Gustafson MD [facility]

## 2023-03-10 ENCOUNTER — HOSPITAL ENCOUNTER (OUTPATIENT)
Facility: HOSPITAL | Age: 43
Setting detail: RECURRING SERIES
End: 2023-03-10
Payer: MEDICAID

## 2023-03-14 ENCOUNTER — HOSPITAL ENCOUNTER (OUTPATIENT)
Facility: HOSPITAL | Age: 43
Setting detail: RECURRING SERIES
Discharge: HOME | End: 2023-03-17
Payer: MEDICAID

## 2023-03-14 PROCEDURE — 97116 GAIT TRAINING THERAPY: CPT

## 2023-03-14 PROCEDURE — 97112 NEUROMUSCULAR REEDUCATION: CPT

## 2023-03-14 PROCEDURE — 97530 THERAPEUTIC ACTIVITIES: CPT

## 2023-03-14 PROCEDURE — 97110 THERAPEUTIC EXERCISES: CPT

## 2023-03-14 NOTE — PROGRESS NOTES
PHYSICAL / OCCUPATIONAL THERAPY - DAILY TREATMENT NOTE (updated )    Patient Name: Tiffany Parkinson    Date: 3/14/2023    : 1980  Insurance: Payor: [employer] / Plan: EXP [employer]ILY [employer] / Product Type: *No Product type* /      Patient  verified yes     Visit #   Current / Total 2 10 Total Time   Time   In / Out 2:39 3:30 51   Pain   In / Out 5 8    Subjective Functional Status/Changes: I have been working on my exercises at home. Why are the bones rubbing? Changes to:  Meds, Allergies, Med Hx, Sx Hx? If yes, update Summary List no      TREATMENT AREA =  Left knee pain [M25.562]  Right knee pain [M25.561]    OBJECTIVE    Therapeutic Procedures:  46  Total   BC Totals Reminder: bill using total billable min of TIMED therapeutic procedures (example: do not include dry needle or estim unattended, both untimed codes, in totals to left)  8-22 min = 1 unit; 23-37 min = 2 units; 38-52 min = 3 units; 53-67 min = 4 units; 68-82 min = 5 units   Tx Min  Procedure, Rationale, Specifics   16  04815 Therapeutic Exercise (timed):  increase ROM, strength, coordination, balance, and proprioception to improve patient's ability to progress to PLOF and address remaining functional goals. (see flow sheet as applicable)   Details if applicable:       11  99506 Therapeutic Activity (timed):  use of dynamic activities replicating functional movements to increase ROM, strength, coordination, balance, and proprioception in order to improve patient's ability to progress to PLOF and address remaining functional goals.   (see flow sheet as applicable)   Details if applicable:    Role of PT in addressing knee OA     12  16194 Neuromuscular Re-Education (timed):  improve balance, coordination, kinesthetic sense, posture, core stability and proprioception to improve patient's ability to develop conscious control of individual muscles and awareness of position of extremities in order to progress to PLOF and address remaining functional goals. (see flow sheet as applicable)     Details if applicable:       12  16670 Gait Training (timed):       40 feet x 2 of following-  [x] Banded March            [x] Banded Lateral Knee Ext                 [] Tandem         [x] Banded Lateral Knee Bend     [] Vertical HT  [x] Retrograde    [] Banded Monster   [] Dual Task  [] Hurdles          [] Lizbeth Leavitt                    [] Ladder Drills  [x] Heel Walk      [x] Toe Walk         [x]  Patient Education billed concurrently with other procedures   [x] Review HEP    [] Progressed/Changed HEP, detail:    [] Other detail:       Objective Information/Functional Measures/Assessment    Focus of today's treatment on controlled introduction to all therapy interventions. The patient requires verbal/visual cuing to improve technique with all interventions, especially to decrease genu valgus with squats. Focus future sessions on continued hamstring strengthening. Patient will continue to benefit from skilled PT / OT services to modify and progress therapeutic interventions, analyze and address functional mobility deficits, analyze and address ROM deficits, analyze and address strength deficits, analyze and address soft tissue restrictions, analyze and cue for proper movement patterns, analyze and modify for postural abnormalities, analyze and address imbalance/dizziness, and instruct in home and community integration to address functional deficits and attain remaining goals. Progress toward goals / Updated goals:  []  See Progress Note/Recertification    Short Term Goals: To be accomplished in 4 treatments  Patient will report daily compliance with HEP to improve tolerance to squatting and walking. Status at last assessment: Edu in HEP during initial evaluation  Current: pt reports compliance (3/14/23)  Long Term Goals: To be accomplished in 10 treatments  Patient will report pain 2/10 at worst to improve tolerance to lifting.   Status at last assessment:  Patient will increase bilateral hip extension to 5/5 to improve increased tolerance to lifting. Status at last assessment: bilateral hip extension strength 4+/5  Patient will increase FOTO Score to 74 points to improve tolerance to lifting.   Status at last assessment: 67 points    PLAN  [x] Continue plan of care  [x]  Upgrade activities as tolerated  []  Discharge due to :  []  Other:    Simran Sanchez, PT    3/14/2023    10:52 AM    Future Appointments   Date Time Provider Wong Jarvis   3/14/2023  2:30 PM Simran Sanchez, PT [facility]   3/16/2023  2:00 PM Anne Machado, PTA [facility]   3/20/2023  3:30 PM Tylene Splinter, PT [facility]   3/22/2023  3:30 PM Tylene Splinter, PT [facility]   3/27/2023  3:30 PM Anne Machado, PTA [facility]   3/29/2023  3:00 PM Chilango Claudio, PT [facility]   4/3/2023  3:30 PM Tylene Splinter, PT [facility]   4/5/2023  3:30 PM Chilango Claudio, PT [facility]   7/14/2023  1:20 PM Sandee Beach MD [facility]

## 2023-03-20 ENCOUNTER — APPOINTMENT (OUTPATIENT)
Facility: HOSPITAL | Age: 43
End: 2023-03-20
Payer: MEDICAID

## 2023-03-22 ENCOUNTER — HOSPITAL ENCOUNTER (OUTPATIENT)
Facility: HOSPITAL | Age: 43
Setting detail: RECURRING SERIES
Discharge: HOME | End: 2023-03-25
Payer: MEDICAID

## 2023-03-22 PROCEDURE — 97112 NEUROMUSCULAR REEDUCATION: CPT

## 2023-03-22 PROCEDURE — 97530 THERAPEUTIC ACTIVITIES: CPT

## 2023-03-22 PROCEDURE — 97110 THERAPEUTIC EXERCISES: CPT

## 2023-03-22 PROCEDURE — 97116 GAIT TRAINING THERAPY: CPT

## 2023-03-22 NOTE — PROGRESS NOTES
applicable:       10  74768 Gait Training (timed):       40 feet x 2 of following-  [] Banded March            [] Banded Lateral Knee Ext                 [] Tandem         [x] Banded Lateral    [] Vertical HT  [x] GTB Retrograde    [] Banded Monster   [] Dual Task  [] Hurdles          [] Rober Croak                    [] Ladder Drills  [x] Heel Walk      [x] Toe Walk         [x]  Patient Education billed concurrently with other procedures   [x] Review HEP    [] Progressed/Changed HEP, detail:    [] Other detail:       Objective Information/Functional Measures/Assessment  Pt tolerated all interventions without incr in knee pain, although largely avoided strengthening req WB knee flex/ext. Pt with incr ankle strategy and pronation in SLS on foam but is able to maintain 30 sec B. Pt denied MHP in clinic as he was experiencing no pain, reports will use at home. Patient will continue to benefit from skilled PT / OT services to modify and progress therapeutic interventions, analyze and address functional mobility deficits, analyze and address ROM deficits, analyze and address strength deficits, analyze and address soft tissue restrictions, analyze and cue for proper movement patterns, analyze and modify for postural abnormalities, analyze and address imbalance/dizziness, and instruct in home and community integration to address functional deficits and attain remaining goals. Progress toward goals / Updated goals:    Short Term Goals: To be accomplished in 4 treatments  Patient will report daily compliance with HEP to improve tolerance to squatting and walking. Status at last assessment: Edu in HEP during initial evaluation  Current: pt reports compliance (3/14/23)  Long Term Goals: To be accomplished in 10 treatments  Patient will report pain 2/10 at worst to improve tolerance to lifting.   Status at last assessment:  Patient will increase bilateral hip extension to 5/5 to improve increased tolerance to

## 2023-03-27 ENCOUNTER — HOSPITAL ENCOUNTER (OUTPATIENT)
Facility: HOSPITAL | Age: 43
Setting detail: RECURRING SERIES
Discharge: HOME | End: 2023-03-30
Payer: MEDICAID

## 2023-03-27 PROCEDURE — 97112 NEUROMUSCULAR REEDUCATION: CPT

## 2023-03-27 PROCEDURE — 97530 THERAPEUTIC ACTIVITIES: CPT

## 2023-03-27 PROCEDURE — 97116 GAIT TRAINING THERAPY: CPT

## 2023-03-27 PROCEDURE — 97110 THERAPEUTIC EXERCISES: CPT

## 2023-03-27 NOTE — PROGRESS NOTES
Details if applicable:       10  28058 Gait Training (timed):       40 feet x 2 of following-  [] Banded March            [] Banded Lateral Knee Ext                 [] Tandem         [x] Banded Lateral    [] Vertical HT  [x] GTB Retrograde    [] Banded Monster   [] Dual Task  [] Hurdles          [] Josephus Mass                    [] Ladder Drills  [x] Heel Walk      [x] Toe Walk         [x]  Patient Education billed concurrently with other procedures   [x] Review HEP    [] Progressed/Changed HEP, detail:    [] Other detail:       Objective Information/Functional Measures/Assessment  Patient required min cueing to cognizant hip ER/ABD to minimize genu valgus with all lunge and squat attempts. Patient will continue to benefit from skilled PT / OT services to modify and progress therapeutic interventions, analyze and address functional mobility deficits, analyze and address ROM deficits, analyze and address strength deficits, analyze and address soft tissue restrictions, analyze and cue for proper movement patterns, analyze and modify for postural abnormalities, analyze and address imbalance/dizziness, and instruct in home and community integration to address functional deficits and attain remaining goals. Progress toward goals / Updated goals:    Short Term Goals: To be accomplished in 4 treatments  Patient will report daily compliance with HEP to improve tolerance to squatting and walking. Status at last assessment: Edu in HEP during initial evaluation  Current: pt reports compliance (3/14/23)  Long Term Goals: To be accomplished in 10 treatments  Patient will report pain 2/10 at worst to improve tolerance to lifting. Status at last assessment:  Patient will increase bilateral hip extension to 5/5 to improve increased tolerance to lifting.   Status at last assessment: bilateral hip extension strength 4+/5  Current 3/27/2023: addressing with 3way hip   Patient will increase FOTO Score to 74 points to improve

## 2023-03-29 ENCOUNTER — HOSPITAL ENCOUNTER (OUTPATIENT)
Facility: HOSPITAL | Age: 43
Setting detail: RECURRING SERIES
Discharge: HOME | End: 2023-04-01
Payer: MEDICAID

## 2023-03-29 PROCEDURE — 97530 THERAPEUTIC ACTIVITIES: CPT

## 2023-03-29 PROCEDURE — 97112 NEUROMUSCULAR REEDUCATION: CPT

## 2023-03-29 PROCEDURE — 97116 GAIT TRAINING THERAPY: CPT

## 2023-03-29 PROCEDURE — 97110 THERAPEUTIC EXERCISES: CPT

## 2023-03-29 NOTE — PROGRESS NOTES
assessment: bilateral hip extension strength 4+/5  Current 3/29/2023: addressing with 3way hip   Patient will increase FOTO Score to 74 points to improve tolerance to lifting.   Status at last assessment: 67 points    PLAN  [x] Continue plan of care  [x]  Upgrade activities as tolerated  []  Discharge due to :  []  Other:    King Echavarria, PT    3/29/2023       Future Appointments   Date Time Provider Wong Jarvis   4/3/2023  3:30 PM Raphael Delacruz, PT [facility]   4/5/2023  3:30 PM King Echavarria, PT [facility]   7/14/2023  1:20 PM Cali Jarquin MD [facility]

## 2023-04-03 ENCOUNTER — HOSPITAL ENCOUNTER (OUTPATIENT)
Facility: HOSPITAL | Age: 43
Setting detail: RECURRING SERIES
Discharge: HOME | End: 2023-04-06
Payer: MEDICAID

## 2023-04-03 PROCEDURE — 97110 THERAPEUTIC EXERCISES: CPT

## 2023-04-03 PROCEDURE — 97530 THERAPEUTIC ACTIVITIES: CPT

## 2023-04-03 PROCEDURE — 97112 NEUROMUSCULAR REEDUCATION: CPT

## 2023-04-03 PROCEDURE — 97116 GAIT TRAINING THERAPY: CPT

## 2023-04-03 NOTE — PROGRESS NOTES
remaining functional goals. (see flow sheet as applicable)     Details if applicable: glute and quad re-ed       8  91867 Gait Training (timed):       40 feet x 2 of following-  [] Banded March            [] Banded Lateral Knee Ext                 [] Tandem         [x] GTB Lateral    [] Vertical HT  [x] GTB Retrograde    [x] GTB Monster   [] Dual Task  [] Hurdles          [] Caffie Lanius                    [] Ladder Drills  [] Heel Walk      [] Toe Walk         [x]  Patient Education billed concurrently with other procedures   [x] Review HEP    [] Progressed/Changed HEP, detail:    [] Other detail:       Objective Information/Functional Measures/Assessment  Squat: narrow KAITLYNN, ant knee translation B  SLS on foam: L: 23 sec, R: 22 sec  Tactile cues and visual feedback with mirror for proper squat technique and hip hinge. Pt demonstrated carry over and improved technique with dip downs from 6\" with min cues for hip hinge. Patient will continue to benefit from skilled PT / OT services to modify and progress therapeutic interventions, analyze and address functional mobility deficits, analyze and address ROM deficits, analyze and address strength deficits, analyze and address soft tissue restrictions, analyze and cue for proper movement patterns, analyze and modify for postural abnormalities, analyze and address imbalance/dizziness, and instruct in home and community integration to address functional deficits and attain remaining goals. Progress toward goals / Updated goals:    Short Term Goals: To be accomplished in 4 treatments  Patient will report daily compliance with HEP to improve tolerance to squatting and walking. Status at last assessment: Edu in HEP during initial evaluation  Current: pt reports compliance (3/14/23)  Long Term Goals: To be accomplished in 10 treatments  Patient will report pain 2/10 at worst to improve tolerance to lifting.   Status at last assessment: 8/10 at worst  Current: 8/10 pain at

## 2023-04-05 ENCOUNTER — HOSPITAL ENCOUNTER (OUTPATIENT)
Facility: HOSPITAL | Age: 43
Setting detail: RECURRING SERIES
Discharge: HOME | End: 2023-04-08
Payer: MEDICAID

## 2023-04-05 PROCEDURE — 97112 NEUROMUSCULAR REEDUCATION: CPT

## 2023-04-05 PROCEDURE — 97110 THERAPEUTIC EXERCISES: CPT

## 2023-04-05 PROCEDURE — 97530 THERAPEUTIC ACTIVITIES: CPT

## 2023-04-05 PROCEDURE — 97116 GAIT TRAINING THERAPY: CPT

## 2023-04-05 NOTE — PROGRESS NOTES
remaining functional goals. (see flow sheet as applicable)     Details if applicable: glute and quad re-ed       8  47974 Gait Training (timed):       40 feet x 2 of following-  [] Banded March            [] Banded Lateral Knee Ext                 [] Tandem         [x] GTB Lateral    [] Vertical HT  [x] GTB Retrograde    [x] GTB Monster   [] Dual Task  [] Hurdles          [] Aixa Ridges                    [] Ladder Drills  [] Heel Walk      [] Toe Walk         [x]  Patient Education billed concurrently with other procedures   [x] Review HEP    [] Progressed/Changed HEP, detail:    [] Other detail:       Objective Information/Functional Measures/Assessment:    Subjective:  Reported Improvement: 50%  Pain: Average 6/10   Best 5/10   Worst 8/10  Reported Functional Deficits: pain squatting at work  Reported Functional Improvements: less pain with stairs, walking    Objective:  Range of Motion:  Knee AROM Right Left   Flexion 130 degrees 131  Degrees   Extension 1 degrees 1 Degree   Patella Mobility WNL WNL      Strength:  Manual Muscle Testing: Right Left   Hip Flexion 5/5 5/5   Knee Extension 4+/5 4+/5   Knee Flexion 5/5 5/5   Ankle Dorsiflexion 5/5 5/5   Ankle Plantarflexion 5/5 5/5   Ankle Eversion 5/5 5/5   Great Toe Extension 5/5 5/5   Hip Abduction 5/5 5/5   Hip Adduction 5/5 5/5   Hip Extension 5/5 5/5   Hip External Rotation 5/5 5/5   Hip Internal Rotation 5/5 5/5     FOTO Score: 60 points    Patient will continue to benefit from skilled PT / OT services to modify and progress therapeutic interventions, analyze and address functional mobility deficits, analyze and address ROM deficits, analyze and address strength deficits, analyze and address soft tissue restrictions, analyze and cue for proper movement patterns, analyze and modify for postural abnormalities, analyze and address imbalance/dizziness, and instruct in home and community integration to address functional deficits and attain remaining goals.     Progress

## 2023-12-06 ENCOUNTER — OFFICE VISIT (OUTPATIENT)
Facility: CLINIC | Age: 43
End: 2023-12-06
Payer: MEDICAID

## 2023-12-06 VITALS
DIASTOLIC BLOOD PRESSURE: 83 MMHG | RESPIRATION RATE: 16 BRPM | HEIGHT: 72 IN | SYSTOLIC BLOOD PRESSURE: 136 MMHG | HEART RATE: 78 BPM | BODY MASS INDEX: 36.87 KG/M2 | OXYGEN SATURATION: 97 % | WEIGHT: 272.2 LBS | TEMPERATURE: 98 F

## 2023-12-06 DIAGNOSIS — R12 HEARTBURN: ICD-10-CM

## 2023-12-06 DIAGNOSIS — G47.33 OSA (OBSTRUCTIVE SLEEP APNEA): ICD-10-CM

## 2023-12-06 DIAGNOSIS — Z00.00 ENCOUNTER FOR GENERAL ADULT MEDICAL EXAMINATION WITHOUT ABNORMAL FINDINGS: ICD-10-CM

## 2023-12-06 DIAGNOSIS — H65.192 OTHER NON-RECURRENT ACUTE NONSUPPURATIVE OTITIS MEDIA OF LEFT EAR: Primary | ICD-10-CM

## 2023-12-06 PROCEDURE — 99214 OFFICE O/P EST MOD 30 MIN: CPT | Performed by: STUDENT IN AN ORGANIZED HEALTH CARE EDUCATION/TRAINING PROGRAM

## 2023-12-06 RX ORDER — OMEPRAZOLE 40 MG/1
40 CAPSULE, DELAYED RELEASE ORAL DAILY
Qty: 30 CAPSULE | Refills: 3 | Status: SHIPPED | OUTPATIENT
Start: 2023-12-06

## 2023-12-06 RX ORDER — MELOXICAM 7.5 MG/1
7.5 TABLET ORAL DAILY
Qty: 30 TABLET | Status: CANCELLED | OUTPATIENT
Start: 2023-12-06

## 2023-12-06 RX ORDER — AMOXICILLIN AND CLAVULANATE POTASSIUM 875; 125 MG/1; MG/1
1 TABLET, FILM COATED ORAL 2 TIMES DAILY
Qty: 14 TABLET | Refills: 0 | Status: SHIPPED | OUTPATIENT
Start: 2023-12-06 | End: 2023-12-13

## 2023-12-06 ASSESSMENT — ENCOUNTER SYMPTOMS
EYE ITCHING: 0
CONSTIPATION: 0
RHINORRHEA: 1
EYE REDNESS: 0
ABDOMINAL PAIN: 0
VOMITING: 0
EYE PAIN: 0
COLOR CHANGE: 0
EYE DISCHARGE: 0
SHORTNESS OF BREATH: 0
DIARRHEA: 0
BACK PAIN: 0
FACIAL SWELLING: 0

## 2023-12-06 ASSESSMENT — PATIENT HEALTH QUESTIONNAIRE - PHQ9
SUM OF ALL RESPONSES TO PHQ QUESTIONS 1-9: 0
1. LITTLE INTEREST OR PLEASURE IN DOING THINGS: 0
SUM OF ALL RESPONSES TO PHQ QUESTIONS 1-9: 0
SUM OF ALL RESPONSES TO PHQ QUESTIONS 1-9: 0
2. FEELING DOWN, DEPRESSED OR HOPELESS: 0
SUM OF ALL RESPONSES TO PHQ QUESTIONS 1-9: 0
SUM OF ALL RESPONSES TO PHQ9 QUESTIONS 1 & 2: 0

## 2023-12-06 NOTE — PROGRESS NOTES
Farhad Mccormick is a 37 y.o.  male and presents with    Chief Complaint   Patient presents with    Cough     W/Expectoration, ear fullnes, sore throat and congestion for 2 weeks. Tried OTC            Subjective:    Began feeling sick about 2 weeks ago. Felt like it started in the chest, and then went to his head. He is having ear pain, and sinus pain. He feels like he has this sinus issues at least once a year. He has been trying to do nasal saline. States he was exposed to someone sick. He has been having also sore throat, ear fullness, and congestion w/ rhinorrhea. Patient Active Problem List   Diagnosis    Acute respiratory failure ([address])    Pneumonia due to COVID-19 virus      History reviewed. No pertinent past medical history. History reviewed. No pertinent surgical history. Family History   Problem Relation Age of Onset    Hypertension Brother     Heart Attack Father 61     Social History     Socioeconomic History    Marital status:      Spouse name: Not on file    Number of children: Not on file    Years of education: Not on file    Highest education level: Not on file   Occupational History    Not on file   Tobacco Use    Smoking status: Former     Types: Cigarettes     Quit date: 2019     Years since quittin.9    Smokeless tobacco: Never   Substance and Sexual Activity    Alcohol use:  Yes     Alcohol/week: 6.0 - 8.0 standard drinks of alcohol    Drug use: Never    Sexual activity: Not on file   Other Topics Concern    Not on file   Social History Narrative    Not on file     Social Determinants of Health     Financial Resource Strain: Not on file   Food Insecurity: Not on file   Transportation Needs: Not on file   Physical Activity: Not on file   Stress: Not on file   Social Connections: Not on file   Intimate Partner Violence: Not on file   Housing Stability: Not on file        Current Outpatient Medications   Medication Sig Dispense Refill    albuterol sulfate HFA

## 2023-12-06 NOTE — PROGRESS NOTES
Lesli Alarcon is a 37 y.o. presents today for   Chief Complaint   Patient presents with    Follow-up     Is someone accompanying this pt? No      Is the patient using any DME equipment during OV? No      Health Maintenance Due   Topic Date Due    Hepatitis B vaccine (1 of 3 - 3-dose series) Never done    COVID-19 Vaccine (1) Never done    Hepatitis C screen  Never done    Flu vaccine (1) Never done    Depression Screen  10/13/2023    Colorectal Cancer Screen  Never done         Coordination of Care:   1. \"Have you been to the ER, urgent care clinic since your last visit? Hospitalized since your last visit? \" Yes, ED elbow pain     2. \"Have you seen or consulted any other health care providers outside of the [address] [place] [address] since your last visit? \" No     3. For patients aged 43-73: Has the patient had a colonoscopy / FIT/ Cologuard? No      If the patient is female:    4. For patients aged 43-66: Has the patient had a mammogram within the past 2 years? NA - based on age or sex      11. For patients aged 21-65: Has the patient had a pap smear?  NA - based on age or sex    Renetta Joyce

## 2024-02-07 LAB
A/G RATIO: 1.5 RATIO (ref 1.1–2.6)
ALBUMIN SERPL-MCNC: 4 G/DL (ref 3.5–5)
ALP BLD-CCNC: 69 U/L (ref 25–115)
ALT SERPL-CCNC: 45 U/L (ref 5–40)
ANION GAP SERPL CALCULATED.3IONS-SCNC: 10 MMOL/L (ref 3–15)
AST SERPL-CCNC: 21 U/L (ref 10–37)
BILIRUB SERPL-MCNC: 0.9 MG/DL (ref 0.2–1.2)
BILIRUB SERPL-MCNC: NEGATIVE MG/DL
BLOOD: NEGATIVE
BUN BLDV-MCNC: 15 MG/DL (ref 6–22)
CALCIUM SERPL-MCNC: 9.6 MG/DL (ref 8.4–10.5)
CHLORIDE BLD-SCNC: 105 MMOL/L (ref 98–110)
CHOLESTEROL/HDL RATIO: 2.4 (ref 0–5)
CHOLESTEROL: 123 MG/DL (ref 110–200)
CLARITY: CLEAR
CO2: 25 MMOL/L (ref 20–32)
COLOR: YELLOW
CREAT SERPL-MCNC: 1.1 MG/DL (ref 0.5–1.2)
ESTIMATED AVERAGE GLUCOSE: 95 MG/DL (ref 91–123)
GLOBULIN: 2.6 G/DL (ref 2–4)
GLOMERULAR FILTRATION RATE: >60 ML/MIN/1.73 SQ.M.
GLUCOSE: 105 MG/DL (ref 70–99)
GLUCOSE: NEGATIVE MG/DL
HBA1C MFR BLD: 4.9 % (ref 4.8–5.6)
HCT VFR BLD CALC: 46 % (ref 39.3–51.6)
HDLC SERPL-MCNC: 51 MG/DL
HEMOGLOBIN: 14.2 G/DL (ref 13.1–17.2)
KETONES, URINE: NEGATIVE MG/DL
LDL CHOLESTEROL CALCULATED: 42 MG/DL (ref 50–99)
LDL/HDL RATIO: 0.8
LEUKOCYTE ESTERASE, URINE: NEGATIVE
MCH RBC QN AUTO: 29 PG (ref 26–34)
MCHC RBC AUTO-ENTMCNC: 31 G/DL (ref 31–36)
MCV RBC AUTO: 93 FL (ref 80–95)
NITRITE, URINE: NEGATIVE
NON-HDL CHOLESTEROL: 72 MG/DL
PDW BLD-RTO: 12.1 % (ref 10–15.5)
PH, URINE: 6.5 PH (ref 5–8)
PLATELET # BLD: 276 K/UL (ref 140–440)
PMV BLD AUTO: 12.6 FL (ref 9–13)
POTASSIUM SERPL-SCNC: 4.4 MMOL/L (ref 3.5–5.5)
PROTEIN UA: NEGATIVE MG/DL
RBC: 4.95 M/UL (ref 3.8–5.8)
SODIUM BLD-SCNC: 140 MMOL/L (ref 133–145)
SPECIFIC GRAVITY: 1.02 (ref 1–1.03)
TOTAL PROTEIN: 6.6 G/DL (ref 6.4–8.3)
TRIGL SERPL-MCNC: 148 MG/DL (ref 40–149)
TSH SERPL DL<=0.05 MIU/L-ACNC: 1.29 MCU/ML (ref 0.27–4.2)
UROBILINOGEN: 1 MG/DL
VLDLC SERPL CALC-MCNC: 30 MG/DL (ref 8–30)
WBC: 5.7 K/UL (ref 4–11)

## 2024-03-21 ENCOUNTER — OFFICE VISIT (OUTPATIENT)
Facility: CLINIC | Age: 44
End: 2024-03-21
Payer: MEDICAID

## 2024-03-21 VITALS
DIASTOLIC BLOOD PRESSURE: 70 MMHG | BODY MASS INDEX: 37.52 KG/M2 | SYSTOLIC BLOOD PRESSURE: 110 MMHG | HEART RATE: 77 BPM | TEMPERATURE: 97.9 F | OXYGEN SATURATION: 96 % | HEIGHT: 72 IN | RESPIRATION RATE: 12 BRPM | WEIGHT: 277 LBS

## 2024-03-21 DIAGNOSIS — G47.33 OSA ON CPAP: ICD-10-CM

## 2024-03-21 DIAGNOSIS — M54.6 ACUTE LEFT-SIDED THORACIC BACK PAIN: ICD-10-CM

## 2024-03-21 DIAGNOSIS — R74.8 ELEVATED LIVER ENZYMES: ICD-10-CM

## 2024-03-21 DIAGNOSIS — Z12.11 COLON CANCER SCREENING: Primary | ICD-10-CM

## 2024-03-21 PROCEDURE — 99214 OFFICE O/P EST MOD 30 MIN: CPT | Performed by: STUDENT IN AN ORGANIZED HEALTH CARE EDUCATION/TRAINING PROGRAM

## 2024-03-21 SDOH — ECONOMIC STABILITY: FOOD INSECURITY: WITHIN THE PAST 12 MONTHS, YOU WORRIED THAT YOUR FOOD WOULD RUN OUT BEFORE YOU GOT MONEY TO BUY MORE.: NEVER TRUE

## 2024-03-21 SDOH — ECONOMIC STABILITY: HOUSING INSECURITY
IN THE LAST 12 MONTHS, WAS THERE A TIME WHEN YOU DID NOT HAVE A STEADY PLACE TO SLEEP OR SLEPT IN A SHELTER (INCLUDING NOW)?: NO

## 2024-03-21 SDOH — ECONOMIC STABILITY: INCOME INSECURITY: HOW HARD IS IT FOR YOU TO PAY FOR THE VERY BASICS LIKE FOOD, HOUSING, MEDICAL CARE, AND HEATING?: NOT HARD AT ALL

## 2024-03-21 SDOH — ECONOMIC STABILITY: FOOD INSECURITY: WITHIN THE PAST 12 MONTHS, THE FOOD YOU BOUGHT JUST DIDN'T LAST AND YOU DIDN'T HAVE MONEY TO GET MORE.: NEVER TRUE

## 2024-03-21 ASSESSMENT — ENCOUNTER SYMPTOMS
EYE REDNESS: 0
BACK PAIN: 0
EYE PAIN: 0
FACIAL SWELLING: 0
SHORTNESS OF BREATH: 0
ABDOMINAL PAIN: 0
DIARRHEA: 0
EYE ITCHING: 0
VOMITING: 0
EYE DISCHARGE: 0
COLOR CHANGE: 0

## 2024-03-21 ASSESSMENT — PATIENT HEALTH QUESTIONNAIRE - PHQ9
1. LITTLE INTEREST OR PLEASURE IN DOING THINGS: NOT AT ALL
SUM OF ALL RESPONSES TO PHQ QUESTIONS 1-9: 0
SUM OF ALL RESPONSES TO PHQ9 QUESTIONS 1 & 2: 0
2. FEELING DOWN, DEPRESSED OR HOPELESS: NOT AT ALL
SUM OF ALL RESPONSES TO PHQ QUESTIONS 1-9: 0

## 2024-03-21 NOTE — PROGRESS NOTES
Kris Chauhan is a 43 y.o. year old male who presents today for   Chief Complaint   Patient presents with    Discuss Labs       Is someone accompanying this pt? No     Is the patient using any DME equipment during OV? No     Depression Screening:       3/21/2024     3:55 PM 12/6/2023     2:20 PM 10/13/2022    12:28 PM 7/12/2022     8:37 AM 4/11/2022     3:21 PM 12/28/2020     1:10 PM   PHQ-9 Questionaire   Little interest or pleasure in doing things 0 0 0 0 0 0   Feeling down, depressed, or hopeless 0 0 0 1 0 0   PHQ-9 Total Score 0 0 0 1 0 0       Abuse Screening:       No data to display                Learning Assessment:  No question data found.    Fall Risk:       No data to display                    Coordination of Care:   1. \"Have you been to the ER, urgent care clinic since your last visit?  Hospitalized since your last visit?\" No     2. \"Have you seen or consulted any other health care providers outside of the [facility] since your last visit?\" Yes     3. For patients aged 45-75: Has the patient had a colonoscopy / FIT/ Cologuard? Due     If the patient is female:    4. For patients aged 40-74: Has the patient had a mammogram within the past 2 years? N/A    5. For patients aged 21-65: Has the patient had a pap smear? N/A    Health Maintenance: reviewed and discussed and ordered per Provider.    Health Maintenance Due   Topic Date Due    Hepatitis B vaccine (1 of 3 - 3-dose series) Never done    COVID-19 Vaccine (1) Never done    Hepatitis C screen  Never done    Flu vaccine (1) Never done    Colorectal Cancer Screen  Never done        -Shalini Mason LPN  [facility]  Phone: [phone]  Fax: [phone]  
(Non-Medical): No   Physical Activity: Not on file   Stress: Not on file   Social Connections: Not on file   Intimate Partner Violence: Not on file   Housing Stability: Unknown (3/21/2024)    Housing Stability Vital Sign     Unable to Pay for Housing in the Last Year: Not on file     Number of Places Lived in the Last Year: Not on file     Unstable Housing in the Last Year: No        Current Outpatient Medications   Medication Sig Dispense Refill    omeprazole (PRILOSEC) 40 MG delayed release capsule Take 1 capsule by mouth daily 30 capsule 3    albuterol sulfate HFA (PROVENTIL;VENTOLIN;PROAIR) 108 (90 Base) MCG/ACT inhaler Inhale 1 puff into the lungs every 4 hours as needed       No current facility-administered medications for this visit.        ROS   Review of Systems   Constitutional:  Negative for activity change and appetite change.   HENT:  Negative for congestion, drooling, ear discharge, ear pain and facial swelling.    Eyes:  Negative for pain, discharge, redness and itching.   Respiratory:  Negative for shortness of breath.    Cardiovascular:  Negative for leg swelling.   Gastrointestinal:  Negative for abdominal pain, constipation, diarrhea and vomiting.   Genitourinary:  Negative for difficulty urinating, frequency, hematuria and urgency.   Musculoskeletal:  Negative for arthralgias, back pain, myalgias and neck pain.   Skin:  Negative for color change.   Neurological:  Negative for dizziness, seizures, numbness and headaches.   Psychiatric/Behavioral:  Negative for agitation, behavioral problems, decreased concentration, sleep disturbance and suicidal ideas.              Objective:  Vitals:    03/21/24 1555   BP: 110/70   Site: Left Upper Arm   Position: Sitting   Cuff Size: Large Adult   Pulse: 77   Resp: 12   Temp: 97.9 °F (36.6 °C)   TempSrc: Temporal   SpO2: 96%   Weight: 125.6 kg (277 lb)   Height: 1.829 m (6')         Physical Exam  Vitals reviewed.   Constitutional:       Appearance: He is

## 2024-07-11 DIAGNOSIS — R12 HEARTBURN: ICD-10-CM

## 2024-07-11 NOTE — TELEPHONE ENCOUNTER
Pt called in to place a medication refill request for Prilosec. Pt is also asking for a nurse or Dr. Angel to call and discuss his recent lab results.

## 2024-07-12 RX ORDER — OMEPRAZOLE 40 MG/1
40 CAPSULE, DELAYED RELEASE ORAL DAILY
Qty: 30 CAPSULE | Refills: 3 | Status: SHIPPED | OUTPATIENT
Start: 2024-07-12

## 2024-07-12 NOTE — TELEPHONE ENCOUNTER
Medication(s) requesting:   Requested Prescriptions     Pending Prescriptions Disp Refills    omeprazole (PRILOSEC) 40 MG delayed release capsule 30 capsule 3     Sig: Take 1 capsule by mouth daily       Last office visit:  03/21/2024  Next office visit DMA: 8/21/2024

## 2024-08-23 ENCOUNTER — OFFICE VISIT (OUTPATIENT)
Facility: CLINIC | Age: 44
End: 2024-08-23
Payer: MEDICAID

## 2024-08-23 VITALS
WEIGHT: 250.4 LBS | HEIGHT: 72 IN | SYSTOLIC BLOOD PRESSURE: 118 MMHG | HEART RATE: 99 BPM | TEMPERATURE: 98.5 F | BODY MASS INDEX: 33.92 KG/M2 | DIASTOLIC BLOOD PRESSURE: 63 MMHG | OXYGEN SATURATION: 98 % | RESPIRATION RATE: 13 BRPM

## 2024-08-23 DIAGNOSIS — Z12.11 COLON CANCER SCREENING: ICD-10-CM

## 2024-08-23 DIAGNOSIS — M25.561 ACUTE PAIN OF RIGHT KNEE: ICD-10-CM

## 2024-08-23 DIAGNOSIS — R63.4 WEIGHT LOSS OF MORE THAN 10% BODY WEIGHT: Primary | ICD-10-CM

## 2024-08-23 PROCEDURE — 99214 OFFICE O/P EST MOD 30 MIN: CPT | Performed by: STUDENT IN AN ORGANIZED HEALTH CARE EDUCATION/TRAINING PROGRAM

## 2024-08-23 ASSESSMENT — ENCOUNTER SYMPTOMS
FACIAL SWELLING: 0
EYE ITCHING: 0
BACK PAIN: 0
VOMITING: 0
EYE DISCHARGE: 0
DIARRHEA: 0
CONSTIPATION: 0
ABDOMINAL PAIN: 0
EYE PAIN: 0
EYE REDNESS: 0
SHORTNESS OF BREATH: 0
COLOR CHANGE: 0

## 2024-08-23 NOTE — PROGRESS NOTES
Kris Chauhan is a 43 y.o. year old male who presents today for   Chief Complaint   Patient presents with    Follow-up       Is someone accompanying this pt? No     Is the patient using any DME equipment during OV? No     Depression Screening:       3/21/2024     3:55 PM 12/6/2023     2:20 PM 10/13/2022    12:28 PM 7/12/2022     8:37 AM 4/11/2022     3:21 PM 12/28/2020     1:10 PM   PHQ-9 Questionaire   Little interest or pleasure in doing things 0 0 0 0 0 0   Feeling down, depressed, or hopeless 0 0 0 1 0 0   PHQ-9 Total Score 0 0 0 1 0 0       Abuse Screening:       No data to display                Learning Assessment:  No question data found.    Fall Risk:       No data to display                    Coordination of Care:   1. \"Have you been to the ER, urgent care clinic since your last visit?  Hospitalized since your last visit?\" Yes     2. \"Have you seen or consulted any other health care providers outside of the [facility] since your last visit?\" No     3. For patients aged 45-75: Has the patient had a colonoscopy / FIT/ Cologuard? Due     If the patient is female:    4. For patients aged 40-74: Has the patient had a mammogram within the past 2 years? N/A    5. For patients aged 21-65: Has the patient had a pap smear? N/A    Health Maintenance: reviewed and discussed and ordered per Provider.    Health Maintenance Due   Topic Date Due    Hepatitis C screen  Never done    Hepatitis B vaccine (1 of 3 - 19+ 3-dose series) Never done    COVID-19 Vaccine (1 - 2023-24 season) Never done    Colorectal Cancer Screen  Never done    Flu vaccine (1) Never done        -Shalini Mason LPN  [facility]  Phone: [phone]  Fax: [phone]  
compliance with the treatment plan as well as documenting on the day of the visit.    I have discussed the diagnosis with the patient and the intended plan as seen in the above orders.  The patient has received an after-visit summary and questions were answered concerning future plans.  I have discussed medication side effects and warnings with the patient as well. I have reviewed the plan of care with the patient, accepted their input and they are in agreement with the treatment goals.     Jeanne Bynum MD

## 2024-08-24 LAB
A/G RATIO: 1.8 RATIO (ref 1.1–2.6)
ALBUMIN: 4.2 G/DL (ref 3.5–5)
ALP BLD-CCNC: 78 U/L (ref 25–115)
ALT SERPL-CCNC: 47 U/L (ref 5–40)
ANION GAP SERPL CALCULATED.3IONS-SCNC: 8 MMOL/L (ref 3–15)
AST SERPL-CCNC: 19 U/L (ref 10–37)
BACTERIA: NEGATIVE
BILIRUB SERPL-MCNC: 0.6 MG/DL (ref 0.2–1.2)
BILIRUB SERPL-MCNC: NEGATIVE MG/DL
BLOOD: NEGATIVE
BUN BLDV-MCNC: 13 MG/DL (ref 6–22)
CALCIUM SERPL-MCNC: 9.9 MG/DL (ref 8.4–10.5)
CHLORIDE BLD-SCNC: 107 MMOL/L (ref 98–110)
CHOLESTEROL, TOTAL: 102 MG/DL (ref 110–200)
CHOLESTEROL/HDL RATIO: 2.3 (ref 0–5)
CLARITY, UA: ABNORMAL
CO2: 24 MMOL/L (ref 20–32)
COLOR, UA: YELLOW
CREAT SERPL-MCNC: 0.6 MG/DL (ref 0.5–1.2)
EPITHELIAL CELLS: ABNORMAL /HPF
ESTIMATED AVERAGE GLUCOSE: 94 MG/DL (ref 91–123)
GFR, ESTIMATED: >60 ML/MIN/1.73 SQ.M.
GLOBULIN: 2.4 G/DL (ref 2–4)
GLUCOSE: 110 MG/DL (ref 70–99)
GLUCOSE: NEGATIVE MG/DL
HBA1C MFR BLD: 4.9 % (ref 4.8–5.6)
HCT VFR BLD CALC: 46.6 % (ref 39.3–51.6)
HDLC SERPL-MCNC: 45 MG/DL
HEMOGLOBIN: 13.1 G/DL (ref 13.1–17.2)
HYALINE CASTS: ABNORMAL /LPF (ref 0–2)
KETONES, URINE: ABNORMAL MG/DL
LDL CHOLESTEROL: 47 MG/DL (ref 50–99)
LDL/HDL RATIO: 1.1
LEUKOCYTE ESTERASE, URINE: NEGATIVE
MCH RBC QN AUTO: 26 PG (ref 26–34)
MCHC RBC AUTO-ENTMCNC: 28 G/DL (ref 31–36)
MCV RBC AUTO: 92 FL (ref 80–95)
NITRITE, URINE: NEGATIVE
NON-HDL CHOLESTEROL: 57 MG/DL
PDW BLD-RTO: 11.9 % (ref 10–15.5)
PH, URINE: 6 PH (ref 5–8)
PLATELET # BLD: 306 K/UL (ref 140–440)
PMV BLD AUTO: 11.6 FL (ref 9–13)
POTASSIUM SERPL-SCNC: 4.7 MMOL/L (ref 3.5–5.5)
PROTEIN UA: NEGATIVE MG/DL
RBC # BLD: 5.09 M/UL (ref 3.8–5.8)
RBC URINE: ABNORMAL /HPF
SODIUM BLD-SCNC: 139 MMOL/L (ref 133–145)
SPECIFIC GRAVITY UA: 1.02 (ref 1–1.03)
TOTAL PROTEIN: 6.6 G/DL (ref 6.4–8.3)
TRIGL SERPL-MCNC: 47 MG/DL (ref 40–149)
TSH SERPL DL<=0.05 MIU/L-ACNC: <0.01 MCU/ML (ref 0.27–4.2)
UROBILINOGEN, URINE: 0.2 MG/DL
VLDLC SERPL CALC-MCNC: 9 MG/DL (ref 8–30)
WBC # BLD: 4.6 K/UL (ref 4–11)
WBC UA: ABNORMAL /HPF (ref 0–2)

## 2024-08-28 ENCOUNTER — TELEPHONE (OUTPATIENT)
Facility: CLINIC | Age: 44
End: 2024-08-28

## 2024-08-28 DIAGNOSIS — R79.89 LOW SERUM THYROID STIMULATING HORMONE (TSH): ICD-10-CM

## 2024-08-28 DIAGNOSIS — R79.89 LOW TSH LEVEL: Primary | ICD-10-CM

## 2024-08-28 DIAGNOSIS — R63.4 WEIGHT LOSS OF MORE THAN 10% BODY WEIGHT: Primary | ICD-10-CM

## 2024-08-28 NOTE — TELEPHONE ENCOUNTER
Pt states she was here on Friday and said he went to the ER on Monday and said his right knee was swollen. He was seen at [facility]. They deferred him back to the PCP.     Ph: [phone]

## 2024-09-03 ENCOUNTER — LAB (OUTPATIENT)
Facility: CLINIC | Age: 44
End: 2024-09-03

## 2024-09-03 DIAGNOSIS — R79.89 LOW TSH LEVEL: ICD-10-CM

## 2024-09-06 LAB
T3 FREE: 13.4 PG/ML (ref 2.3–4.2)
T3 TOTAL: 379 NG/DL (ref 80–200)
T4 FREE: 2.9 NG/DL (ref 0.9–1.8)
THYROID PEROXIDASE (TPO) ABS: 3207 IU/ML
TSH SERPL DL<=0.05 MIU/L-ACNC: <0.01 MCU/ML (ref 0.27–4.2)

## 2024-09-10 DIAGNOSIS — E05.90 HYPERTHYROIDISM: Primary | ICD-10-CM

## 2024-09-10 RX ORDER — PROPRANOLOL HYDROCHLORIDE 20 MG/1
20 TABLET ORAL 3 TIMES DAILY
Qty: 90 TABLET | Refills: 3 | Status: SHIPPED | OUTPATIENT
Start: 2024-09-10

## 2024-09-23 DIAGNOSIS — E05.00 GRAVES DISEASE: Primary | ICD-10-CM

## 2024-09-23 RX ORDER — METHIMAZOLE 10 MG/1
10 TABLET ORAL DAILY
Qty: 30 TABLET | Refills: 2 | Status: SHIPPED | OUTPATIENT
Start: 2024-09-23 | End: 2024-12-22

## 2024-10-02 ENCOUNTER — OFFICE VISIT (OUTPATIENT)
Facility: CLINIC | Age: 44
End: 2024-10-02
Payer: MEDICAID

## 2024-10-02 ENCOUNTER — HOSPITAL ENCOUNTER (OUTPATIENT)
Facility: HOSPITAL | Age: 44
Setting detail: SPECIMEN
Discharge: HOME | End: 2024-10-05

## 2024-10-02 VITALS
WEIGHT: 250.6 LBS | HEIGHT: 72 IN | DIASTOLIC BLOOD PRESSURE: 89 MMHG | RESPIRATION RATE: 14 BRPM | SYSTOLIC BLOOD PRESSURE: 127 MMHG | BODY MASS INDEX: 33.94 KG/M2 | HEART RATE: 90 BPM | OXYGEN SATURATION: 99 % | TEMPERATURE: 98.2 F

## 2024-10-02 DIAGNOSIS — G89.29 CHRONIC PAIN OF RIGHT KNEE: ICD-10-CM

## 2024-10-02 DIAGNOSIS — E05.00 GRAVES DISEASE: Primary | ICD-10-CM

## 2024-10-02 DIAGNOSIS — H04.203 WATERY EYES: ICD-10-CM

## 2024-10-02 DIAGNOSIS — M25.561 CHRONIC PAIN OF RIGHT KNEE: ICD-10-CM

## 2024-10-02 LAB — SENTARA SPECIMEN COLLECTION: NORMAL

## 2024-10-02 PROCEDURE — 99214 OFFICE O/P EST MOD 30 MIN: CPT | Performed by: STUDENT IN AN ORGANIZED HEALTH CARE EDUCATION/TRAINING PROGRAM

## 2024-10-02 PROCEDURE — 99001 SPECIMEN HANDLING PT-LAB: CPT

## 2024-10-02 RX ORDER — POLYMYXIN B SULFATE AND TRIMETHOPRIM 1; 10000 MG/ML; [USP'U]/ML
SOLUTION OPHTHALMIC
COMMUNITY
Start: 2024-08-28

## 2024-10-02 RX ORDER — TRIAMCINOLONE ACETONIDE 40 MG/ML
40 INJECTION, SUSPENSION INTRA-ARTICULAR; INTRAMUSCULAR ONCE
Status: COMPLETED | OUTPATIENT
Start: 2024-10-02 | End: 2024-10-02

## 2024-10-02 RX ADMIN — TRIAMCINOLONE ACETONIDE 40 MG: 40 INJECTION, SUSPENSION INTRA-ARTICULAR; INTRAMUSCULAR at 15:36

## 2024-10-02 NOTE — PROGRESS NOTES
Kris Chauhan is a 43 y.o. year old male who presents today for   Chief Complaint   Patient presents with    Other     6 wk f/u      Eye Drainage     Teary eye          \"Have you been to the ER, urgent care clinic since your last visit?  Hospitalized since your last visit?\"   NO     “Have you seen or consulted any other health care providers outside our system since your last visit?”   NO       “Have you had a colorectal cancer screening such as a colonoscopy/FIT/Cologuard?    NO    No colonoscopy on file  No cologuard on file  No FIT/FOBT on file   No flexible sigmoidoscopy on file           - SAULO Veloz  [facility]  Phone: [phone]  Fax: [phone]

## 2024-10-03 LAB — T4 FREE: 1.8 NG/DL (ref 0.9–1.8)

## 2024-10-08 ASSESSMENT — ENCOUNTER SYMPTOMS
SHORTNESS OF BREATH: 0
ABDOMINAL PAIN: 0
DIARRHEA: 0
CONSTIPATION: 0
BACK PAIN: 0
EYE DISCHARGE: 0
VOMITING: 0
EYE REDNESS: 0
EYE ITCHING: 0
COLOR CHANGE: 0
FACIAL SWELLING: 0
EYE PAIN: 0

## 2024-10-09 NOTE — PROGRESS NOTES
Kris Chauhan is a 43 y.o.  male and presents with    Chief Complaint   Patient presents with    Other     6 wk f/u      Eye Drainage     Teary eye             Subjective:    Patient states that he has been taking the methimazole as prescribed.  He denies side effects from this.  Has been able to notice the diarrhea he had is now resolved.  His weight has stopped dropping significantly.  He is concerned due to the possibility that the medication has interaction with alcohol, so he has decided to stop drinking.    Patient states that he continues to have significant pain on his right knee.  He does not feel that any symptoms have been improved.    He also is concerned with regards to his either continue to watery.  Previously prescription that was given to him has not worked.    Patient Active Problem List   Diagnosis    Acute respiratory failure    Pneumonia due to COVID-19 virus      No past medical history on file.   No past surgical history on file.   Family History   Problem Relation Age of Onset    Hypertension Brother     Heart Attack Father 60     Social History     Socioeconomic History    Marital status:      Spouse name: Not on file    Number of children: Not on file    Years of education: Not on file    Highest education level: Not on file   Occupational History    Not on file   Tobacco Use    Smoking status: Former     Current packs/day: 0.00     Types: Cigarettes     Quit date: 2019     Years since quittin.7    Smokeless tobacco: Never   Substance and Sexual Activity    Alcohol use: Yes     Alcohol/week: 6.0 - 8.0 standard drinks of alcohol    Drug use: Never    Sexual activity: Not on file   Other Topics Concern    Not on file   Social History Narrative    Not on file     Social Determinants of Health     Financial Resource Strain: Low Risk  (3/21/2024)    Overall Financial Resource Strain (CARDIA)     Difficulty of Paying Living Expenses: Not hard at all   Food

## 2024-10-11 ENCOUNTER — TELEPHONE (OUTPATIENT)
Facility: CLINIC | Age: 44
End: 2024-10-11

## 2024-10-11 NOTE — TELEPHONE ENCOUNTER
Patient called in wants to speak to provider as he hasn't heard from the endocrinologist did give him tn nbr for them. Will reach out to them as well         Please advise.      Thank you

## 2024-11-15 ENCOUNTER — TELEPHONE (OUTPATIENT)
Facility: CLINIC | Age: 44
End: 2024-11-15

## 2024-11-15 NOTE — TELEPHONE ENCOUNTER
Patient called  to get information about the referral for his thyroids.     No information to provide to patient.      Patient would like to call and schedule appointment.     Please advise    Thank you

## 2024-12-03 ENCOUNTER — TELEPHONE (OUTPATIENT)
Facility: CLINIC | Age: 44
End: 2024-12-03

## 2024-12-03 NOTE — TELEPHONE ENCOUNTER
Patient called in stating that he is having peeling on his hands and feet. Skin is starting to crack and peel that is also causing his skin complexion to change. Patient stated he thinks it coming from his Thyroid medication. Please be advised, thank you.

## 2024-12-04 DIAGNOSIS — E05.00 GRAVES DISEASE: Primary | ICD-10-CM

## 2024-12-04 NOTE — TELEPHONE ENCOUNTER
Called patient.  No answer.  Left voicemail for patient to stop taking his thyroid medication at this time due to possible allergies.  He will need to have new blood work done, we will try calling him back in the afternoon

## 2024-12-10 ENCOUNTER — LAB (OUTPATIENT)
Facility: CLINIC | Age: 44
End: 2024-12-10

## 2024-12-10 ENCOUNTER — HOSPITAL ENCOUNTER (OUTPATIENT)
Facility: HOSPITAL | Age: 44
Setting detail: SPECIMEN
Discharge: HOME | End: 2024-12-13

## 2024-12-10 DIAGNOSIS — E05.00 GRAVES DISEASE: ICD-10-CM

## 2024-12-10 LAB — SENTARA SPECIMEN COLLECTION: NORMAL

## 2024-12-10 PROCEDURE — 99001 SPECIMEN HANDLING PT-LAB: CPT

## 2024-12-11 ENCOUNTER — COMMUNITY OUTREACH (OUTPATIENT)
Facility: CLINIC | Age: 44
End: 2024-12-11

## 2024-12-11 LAB
T3 TOTAL: 430 NG/DL (ref 80–200)
T4 FREE: 3.7 NG/DL (ref 0.9–1.8)
TSH SERPL DL<=0.05 MIU/L-ACNC: <0.01 MCU/ML (ref 0.27–4.2)

## 2024-12-11 NOTE — PROGRESS NOTES
Patient's HM shows they are overdue for Colorectal Screening.   Care Everywhere and  files searched.  No results to attach to order nor HM updated.     12/4/2024 colonoscopy canceled.

## 2024-12-19 ENCOUNTER — OFFICE VISIT (OUTPATIENT)
Facility: CLINIC | Age: 44
End: 2024-12-19
Payer: MEDICAID

## 2024-12-19 VITALS
SYSTOLIC BLOOD PRESSURE: 137 MMHG | HEIGHT: 72 IN | WEIGHT: 261.2 LBS | TEMPERATURE: 98 F | HEART RATE: 99 BPM | DIASTOLIC BLOOD PRESSURE: 76 MMHG | BODY MASS INDEX: 35.38 KG/M2 | RESPIRATION RATE: 14 BRPM | OXYGEN SATURATION: 94 %

## 2024-12-19 DIAGNOSIS — E05.00 GRAVES DISEASE: Primary | ICD-10-CM

## 2024-12-19 PROCEDURE — 99214 OFFICE O/P EST MOD 30 MIN: CPT | Performed by: STUDENT IN AN ORGANIZED HEALTH CARE EDUCATION/TRAINING PROGRAM

## 2024-12-19 RX ORDER — PREDNISONE 20 MG/1
60 TABLET ORAL DAILY
Qty: 30 TABLET | Refills: 0 | Status: SHIPPED | OUTPATIENT
Start: 2024-12-19 | End: 2025-01-08

## 2024-12-19 RX ORDER — PROPYLTHIOURACIL 50 MG/1
50 TABLET ORAL 3 TIMES DAILY
Qty: 90 TABLET | Refills: 1 | Status: SHIPPED | OUTPATIENT
Start: 2024-12-19 | End: 2025-02-17

## 2024-12-19 ASSESSMENT — ENCOUNTER SYMPTOMS
EYE REDNESS: 0
SHORTNESS OF BREATH: 0
CONSTIPATION: 0
EYE ITCHING: 0
DIARRHEA: 0
VOMITING: 0
EYE PAIN: 0
EYE DISCHARGE: 0
FACIAL SWELLING: 0
ABDOMINAL PAIN: 0
BACK PAIN: 0
COLOR CHANGE: 0

## 2024-12-19 NOTE — PROGRESS NOTES
Kris Chauhan is a 44 y.o. year old male who presents today for   Chief Complaint   Patient presents with    Discuss Labs    Fatigue    Knee Pain     RIGHT        \"Have you been to the ER, urgent care clinic since your last visit?  Hospitalized since your last visit?\"   NO     “Have you seen or consulted any other health care providers outside our system since your last visit?”   NO       “Have you had a colorectal cancer screening such as a colonoscopy/FIT/Cologuard?    NO    No colonoscopy on file  No cologuard on file  No FIT/FOBT on file   No flexible sigmoidoscopy on file           - SAULO Veloz  [facility]  Phone: [phone]  Fax: [phone]

## 2024-12-20 NOTE — PROGRESS NOTES
Kris Chauhan is a 44 y.o.  male and presents with    Chief Complaint   Patient presents with    Discuss Labs    Fatigue    Knee Pain     RIGHT           Subjective:    Patient previously had been diagnosed with Graves' disease after having lab and nuclear medicine testing done.  He states he has been waiting for his endocrinology appointment, which has been now put for February.  However, couple weeks ago patient called stating that he had developed after starting his second jar of methimazole a rash which was peeling his palms and his feet, with the peeling coming all the way up to his ankles.  Since then he has stopped methimazole and he had new blood work done.  However, since stopping the methimazole he feels that he has been having more fatigue, his vision also has been blurring, and his knee pain has returned.  Also since stopping the methimazole his hands and feet stop peeling.    Patient also states that he is not sure if he had to do with his alcohol drinking that he was doing during the weekends.      Patient Active Problem List   Diagnosis    Acute respiratory failure    Pneumonia due to COVID-19 virus      No past medical history on file.   No past surgical history on file.   Family History   Problem Relation Age of Onset    Hypertension Brother     Heart Attack Father 60     Social History     Socioeconomic History    Marital status:      Spouse name: Not on file    Number of children: Not on file    Years of education: Not on file    Highest education level: Not on file   Occupational History    Not on file   Tobacco Use    Smoking status: Former     Current packs/day: 0.00     Types: Cigarettes     Quit date: 2019     Years since quittin.9    Smokeless tobacco: Never   Substance and Sexual Activity    Alcohol use: Yes     Alcohol/week: 6.0 - 8.0 standard drinks of alcohol    Drug use: Never    Sexual activity: Not on file   Other Topics Concern    Not on file   Social

## 2025-01-03 ENCOUNTER — HOSPITAL ENCOUNTER (OUTPATIENT)
Facility: HOSPITAL | Age: 45
Setting detail: SPECIMEN
Discharge: HOME | End: 2025-01-06

## 2025-01-03 DIAGNOSIS — E05.00 GRAVES DISEASE: Primary | ICD-10-CM

## 2025-01-03 PROCEDURE — 99001 SPECIMEN HANDLING PT-LAB: CPT

## 2025-01-04 LAB — SENTARA SPECIMEN COLLECTION: NORMAL

## 2025-01-06 ENCOUNTER — OFFICE VISIT (OUTPATIENT)
Facility: CLINIC | Age: 45
End: 2025-01-06
Payer: MEDICAID

## 2025-01-06 VITALS
HEART RATE: 90 BPM | DIASTOLIC BLOOD PRESSURE: 86 MMHG | WEIGHT: 266 LBS | OXYGEN SATURATION: 98 % | HEIGHT: 72 IN | BODY MASS INDEX: 36.03 KG/M2 | RESPIRATION RATE: 14 BRPM | TEMPERATURE: 98 F | SYSTOLIC BLOOD PRESSURE: 135 MMHG

## 2025-01-06 DIAGNOSIS — Z20.1 EXPOSURE TO TB: Primary | ICD-10-CM

## 2025-01-06 DIAGNOSIS — E05.00 GRAVES DISEASE: ICD-10-CM

## 2025-01-06 PROCEDURE — 99214 OFFICE O/P EST MOD 30 MIN: CPT | Performed by: STUDENT IN AN ORGANIZED HEALTH CARE EDUCATION/TRAINING PROGRAM

## 2025-01-06 RX ORDER — PREDNISONE 20 MG/1
TABLET ORAL
Qty: 20 TABLET | Refills: 0 | Status: SHIPPED | OUTPATIENT
Start: 2025-01-06 | End: 2025-02-12

## 2025-01-06 ASSESSMENT — PATIENT HEALTH QUESTIONNAIRE - PHQ9
SUM OF ALL RESPONSES TO PHQ QUESTIONS 1-9: 0
1. LITTLE INTEREST OR PLEASURE IN DOING THINGS: NOT AT ALL
SUM OF ALL RESPONSES TO PHQ9 QUESTIONS 1 & 2: 0
2. FEELING DOWN, DEPRESSED OR HOPELESS: NOT AT ALL
SUM OF ALL RESPONSES TO PHQ QUESTIONS 1-9: 0

## 2025-01-06 NOTE — PROGRESS NOTES
Kris Chauhan is a 44 y.o. year old male who presents today for   Chief Complaint   Patient presents with    3 Month Follow-Up        \"Have you been to the ER, urgent care clinic since your last visit?  Hospitalized since your last visit?\"   NO     “Have you seen or consulted any other health care providers outside our system since your last visit?”   NO       “Have you had a colorectal cancer screening such as a colonoscopy/FIT/Cologuard?    NO    No colonoscopy on file  No cologuard on file  No FIT/FOBT on file   No flexible sigmoidoscopy on file           - SAULO Veloz  [facility]  Phone: [phone]  Fax: [phone]  
gets to 10 mg.  He does have a follow-up appointment with endocrinology on the 24th.  - predniSONE (DELTASONE) 20 MG tablet; Take 2 tablets by mouth daily for 15 days, THEN 1 tablet daily for 15 days, THEN 0.5 tablets daily for 7 days.  Dispense: 20 tablet; Refill: 0      Lab review: no lab studies available for review at time of visit    On this date 1/6/2025 I have spent 30 minutes reviewing previous notes, test results and face to face with the patient discussing the diagnosis and importance of compliance with the treatment plan as well as documenting on the day of the visit.    I have discussed the diagnosis with the patient and the intended plan as seen in the above orders.  The patient has received an after-visit summary and questions were answered concerning future plans.  I have discussed medication side effects and warnings with the patient as well. I have reviewed the plan of care with the patient, accepted their input and they are in agreement with the treatment goals.     Jeanne Bynum MD

## 2025-01-07 DIAGNOSIS — E05.00 GRAVES DISEASE: ICD-10-CM

## 2025-01-07 ASSESSMENT — ENCOUNTER SYMPTOMS
EYE ITCHING: 0
BACK PAIN: 0
FACIAL SWELLING: 0
COLOR CHANGE: 0
SHORTNESS OF BREATH: 0
EYE REDNESS: 0
CONSTIPATION: 0
EYE PAIN: 0
VOMITING: 0
EYE DISCHARGE: 0
DIARRHEA: 0
ABDOMINAL PAIN: 0

## 2025-01-07 NOTE — TELEPHONE ENCOUNTER
Medication(s) requesting:   Requested Prescriptions     Pending Prescriptions Disp Refills    predniSONE (DELTASONE) 20 MG tablet 20 tablet 0     Sig: Take 2 tablets by mouth daily for 15 days, THEN 1 tablet daily for 15 days, THEN 0.5 tablets daily for 7 days.       Last office visit:  01/06/2024  Next office visit DMA: 1/8/2025

## 2025-01-07 NOTE — TELEPHONE ENCOUNTER
[facility] called in stating that the following medication Prednisone (DELTASONE) 20 MG tablet does not match the quality of dispensing. Please be advised, thank you.

## 2025-01-08 ENCOUNTER — TELEPHONE (OUTPATIENT)
Facility: CLINIC | Age: 45
End: 2025-01-08

## 2025-01-08 RX ORDER — PREDNISONE 20 MG/1
TABLET ORAL
Qty: 20 TABLET | Refills: 0 | OUTPATIENT
Start: 2025-01-08 | End: 2025-02-13

## 2025-01-08 NOTE — TELEPHONE ENCOUNTER
Prednisone needed, quantity changed per Dr. Angel. Called in verbal to pharmacy with quantity of 49.

## 2025-01-11 LAB
QUANTIFERON MITOGEN VALUE: 9.77 IU/ML
QUANTIFERON NIL VALUE: 0.23 IU/ML
QUANTIFERON TB GOLD PLUS: NEGATIVE
QUANTIFERON TB1 AG VALUE: <0 IU/ML
QUANTIFERON TB2 AG VALUE: 0.01 IU/ML

## 2025-01-27 DIAGNOSIS — R12 HEARTBURN: ICD-10-CM

## 2025-01-27 NOTE — TELEPHONE ENCOUNTER
pharmacy refill request recieved for     omeprazole (PRILOSEC) 40 MG delayed release capsule       LOV:01/06/2025    NOV:02/11/2025    Please advise    Thank you

## 2025-01-28 RX ORDER — OMEPRAZOLE 40 MG/1
40 CAPSULE, DELAYED RELEASE ORAL DAILY
Qty: 30 CAPSULE | Refills: 3 | Status: SHIPPED | OUTPATIENT
Start: 2025-01-28

## 2025-02-19 ENCOUNTER — TELEMEDICINE (OUTPATIENT)
Facility: CLINIC | Age: 45
End: 2025-02-19

## 2025-02-19 DIAGNOSIS — H53.8 BLURRY VISION: ICD-10-CM

## 2025-02-19 DIAGNOSIS — G89.29 CHRONIC PAIN OF RIGHT KNEE: ICD-10-CM

## 2025-02-19 DIAGNOSIS — R52: ICD-10-CM

## 2025-02-19 DIAGNOSIS — M25.561 CHRONIC PAIN OF RIGHT KNEE: ICD-10-CM

## 2025-02-19 DIAGNOSIS — E05.00 GRAVES DISEASE: Primary | ICD-10-CM

## 2025-02-19 ASSESSMENT — ENCOUNTER SYMPTOMS
FACIAL SWELLING: 0
ABDOMINAL PAIN: 0
EYE DISCHARGE: 0
DIARRHEA: 0
BACK PAIN: 0
COLOR CHANGE: 0
VOMITING: 0
SHORTNESS OF BREATH: 0
EYE PAIN: 0
CONSTIPATION: 0
EYE ITCHING: 0
EYE REDNESS: 0

## 2025-02-19 NOTE — PROGRESS NOTES
Kris Chauhan is a 44 y.o.  male and presents with    No chief complaint on file.      Kris Chauhan, was evaluated through a synchronous (real-time) audio-video encounter. The patient (or guardian if applicable) is aware that this is a billable service, which includes applicable co-pays. This Virtual Visit was conducted with patient's (and/or legal guardian's) consent. Patient identification was verified, and a caregiver was present when appropriate.   The patient was located at Home: [address] [address]  Provider was located at Facility (Appt Dept): [address], [address]  [place] [address]  Confirm you are appropriately licensed, registered, or certified to deliver care in the state where the patient is located as indicated above. If you are not or unsure, please re-schedule the visit: Yes, I confirm.        --Jeanne Bynum MD on 2/19/2025 at 1:46 PM    An electronic signature was used to authenticate this note.      Subjective:    Saw endocrinology. He was given propanolol. He continues to be on PTU 50mg TID. Was kept on the dose. States he was told that he seems to be doing well at this time.    He has been having eye pain for the last couple of weeks. It hurts his eyes to see bright lights, and has noted certain things to be blurry like certain words.    His knees have also have been bothering him significantly. He has put on some extra weight. He has seen orthopedics before. He did PT and has had a steroid injection on his knee which he did not feel like it helped at all.    Patient Active Problem List   Diagnosis    Acute respiratory failure (HCC)    Pneumonia due to COVID-19 virus      No past medical history on file.   No past surgical history on file.   Family History   Problem Relation Age of Onset    Hypertension Brother     Heart Attack Father 60     Social History     Socioeconomic History    Marital status:      Spouse name: Not on

## 2025-04-04 ENCOUNTER — OFFICE VISIT (OUTPATIENT)
Age: 45
End: 2025-04-04

## 2025-04-04 VITALS — HEIGHT: 72 IN | BODY MASS INDEX: 35.49 KG/M2 | WEIGHT: 262 LBS

## 2025-04-04 DIAGNOSIS — M25.361 INSTABILITY OF RIGHT KNEE JOINT: ICD-10-CM

## 2025-04-04 DIAGNOSIS — M23.91 INTERNAL DERANGEMENT OF RIGHT KNEE: Primary | ICD-10-CM

## 2025-04-04 DIAGNOSIS — M23.92 INTERNAL DERANGEMENT OF LEFT KNEE: ICD-10-CM

## 2025-04-04 DIAGNOSIS — M17.12 PRIMARY OSTEOARTHRITIS OF LEFT KNEE: ICD-10-CM

## 2025-04-04 DIAGNOSIS — M17.11 PRIMARY OSTEOARTHRITIS OF RIGHT KNEE: ICD-10-CM

## 2025-04-04 RX ORDER — MELOXICAM 15 MG/1
15 TABLET ORAL DAILY
Qty: 30 TABLET | Refills: 2 | Status: SHIPPED | OUTPATIENT
Start: 2025-04-04 | End: 2025-07-03

## 2025-04-04 RX ORDER — ACETAMINOPHEN 500 MG
1000 TABLET ORAL EVERY 8 HOURS PRN
Qty: 180 TABLET | Refills: 0 | Status: SHIPPED | OUTPATIENT
Start: 2025-04-04 | End: 2025-05-04

## 2025-04-04 NOTE — PROGRESS NOTES
was agreeable to this as he is young and wants to be active with his kids. He does admit to having an ACL injury while in residential many years ago but he does not have associated anterior translation on exam. We will have him follow up once the injections are approved.     Patient will benefit from the use of a lateral offloader brace, which maximizes stability and support for knee osteoarthritis. The brace will assist in correcting alignment, providing stability to the knee, and decreasing the chance of injury from falling.    1/23/23:  42 y.o. male with bilateral knee osteoarthritis primarily affecting the lateral joint line.  He does have medial and lateral joint line in the right knee but only lateral joint line pain  on the left knee.   I discussed with the patient the natural history of arthritis and its progressive nature.  We discussed conservative treatment options to include ice, rest, compression, elevation, physical therapy, activity modification, weight loss, dieting, Tylenol,  NSAIDs, topicals, prescription medication, as well as touched on more invasive treatments including injection therapy and joint replacement. and At this time the patient would like to try conservative treatment at this time.  I explained to them if they  experience any symptoms such as catching, locking or more consistent pain that is not adequately treated by conservative measures that they should come back and we can discuss more aggressive treatment with injections.      He already has physical therapy order form from his primary care physician.  I will give him Voltaren gel as he says that his pain comes and goes and mostly is there when it is either cold outside or he has been particularly active.  Asked him to get  physical therapy and the Concerta medication about 3 months and if he still has pain he should come back in 1 we will plan on giving him corticosteroid injections.        Family History   Problem Relation Age of Onset

## 2025-04-07 ENCOUNTER — CLINICAL DOCUMENTATION (OUTPATIENT)
Age: 45
End: 2025-04-07

## 2025-05-15 ENCOUNTER — TELEPHONE (OUTPATIENT)
Facility: CLINIC | Age: 45
End: 2025-05-15

## 2025-05-15 NOTE — TELEPHONE ENCOUNTER
Pt called in to adv he went to [facility] and the physician stated he needs to be referred to a doctor who can look at his eyelid. He only can look at the back of his eye

## 2025-05-16 ENCOUNTER — OFFICE VISIT (OUTPATIENT)
Age: 45
End: 2025-05-16

## 2025-05-16 VITALS — BODY MASS INDEX: 36.03 KG/M2 | WEIGHT: 266 LBS | HEIGHT: 72 IN

## 2025-05-16 DIAGNOSIS — M23.91 INTERNAL DERANGEMENT OF RIGHT KNEE: ICD-10-CM

## 2025-05-16 DIAGNOSIS — M17.11 PRIMARY OSTEOARTHRITIS OF RIGHT KNEE: Primary | ICD-10-CM

## 2025-05-16 RX ORDER — LIDOCAINE HYDROCHLORIDE 10 MG/ML
2 INJECTION, SOLUTION INFILTRATION; PERINEURAL ONCE
Status: COMPLETED | OUTPATIENT
Start: 2025-05-16 | End: 2025-05-16

## 2025-05-16 RX ADMIN — LIDOCAINE HYDROCHLORIDE 2 ML: 10 INJECTION, SOLUTION INFILTRATION; PERINEURAL at 10:47

## 2025-05-16 NOTE — PROGRESS NOTES
Patient: Kris Chauhan                MRN: [id number]        SSN: [id number]   YOB: 1980        AGE:  42 y.o.        SEX: male   Body mass index is 37.3 kg/m².      PCP: Jeanne Patricia MD  5/16/25      Chief Complaint: Knee Pain (Right knee)       1. Primary osteoarthritis of right knee  Assessment & Plan:  After explaining potential risk of infection, skin discoloration, hyperglycemia, or flushing, I obtained written consent the patient would like to move forward with a right knee intra-articular injection the patient was prepped in normal sterile fashion with freeze spray and alcohol.  2mL Euflexxa (1% sodium Hyaluronate) was injected .  The needle was withdrawn, the area was cleansed and a sterile bandage was applied.  The patient tolerated the procedure well.         Orders:  -     DRAIN/INJECT LARGE JOINT/BURSA  -     lidocaine 1 % injection 2 mL; 2 mL, Intra-artICUlar, ONCE, 1 dose, On Fri 5/16/25 at 1115  -     sodium hyaluronate (EUFLEXXA, HYALGAN) injection 20 mg; 20 mg, Intra-artICUlar, ONCE, 1 dose, On Fri 5/16/25 at 1115Which brand are you ordering? Euflexxa  2. Internal derangement of right knee  -     DRAIN/INJECT LARGE JOINT/BURSA  -     lidocaine 1 % injection 2 mL; 2 mL, Intra-artICUlar, ONCE, 1 dose, On Fri 5/16/25 at 1115  -     sodium hyaluronate (EUFLEXXA, HYALGAN) injection 20 mg; 20 mg, Intra-artICUlar, ONCE, 1 dose, On Fri 5/16/25 at 1115Which brand are you ordering? Euflexxa           HPI: Kirs Chauhan is a 42 y.o. male patient with   Chief Complaint   Patient presents with    Knee Pain     Right knee     Has been having knee pain for many years.  He was previously incarcerated and injured his right knee while playing basketball.  He is an avid athlete growing up and has always had trouble with his knees.  He also reports he is always had \"knock knees\". He reports that his pain has gotten increasingly worse in his right knee. He was previously

## 2025-05-16 NOTE — ASSESSMENT & PLAN NOTE
After explaining potential risk of infection, skin discoloration, hyperglycemia, or flushing, I obtained written consent the patient would like to move forward with a right knee intra-articular injection the patient was prepped in normal sterile fashion with freeze spray and alcohol.  2mL Euflexxa (1% sodium Hyaluronate) was injected .  The needle was withdrawn, the area was cleansed and a sterile bandage was applied.  The patient tolerated the procedure well.

## 2025-05-23 ENCOUNTER — OFFICE VISIT (OUTPATIENT)
Age: 45
End: 2025-05-23

## 2025-05-23 VITALS — WEIGHT: 262 LBS | HEIGHT: 72 IN | BODY MASS INDEX: 35.49 KG/M2

## 2025-05-23 DIAGNOSIS — M17.11 PRIMARY OSTEOARTHRITIS OF RIGHT KNEE: Primary | ICD-10-CM

## 2025-05-23 RX ORDER — LIDOCAINE HYDROCHLORIDE 10 MG/ML
2 INJECTION, SOLUTION INFILTRATION; PERINEURAL ONCE
Status: COMPLETED | OUTPATIENT
Start: 2025-05-23 | End: 2025-05-23

## 2025-05-23 RX ADMIN — LIDOCAINE HYDROCHLORIDE 2 ML: 10 INJECTION, SOLUTION INFILTRATION; PERINEURAL at 13:50

## 2025-05-23 NOTE — PROGRESS NOTES
prescribed, PT, RICE, and steroid injections without relief. He continues to have pain in his right knee making it difficult for him to perform his ADLs and play with his son. I discussed with him the risks and benefits of trying gel injections today and after answering all of his questions, he agreed to move forward with these for his right knee. The 1st of 3 were given today and he tolerated with well. We did discuss that he may feel sore for the next day or so but that he can take tylenol to help with this as well as use ice PRN. We will see him back in 1 week for his next shot.     4/4/25:  Patient comes back to us after failing steroid injections with his PCP. He reports that his left knee is ok but he is having a lot of trouble with his right knee. He has a significant amount of pain at his lateral compartment and over his MCL without associated laxity. I discussed a lateral offloading brace with him today to take pressure off of his lateral compartment and MCL as well as gel injections. He was agreeable to this as he is young and wants to be active with his kids. He does admit to having an ACL injury while in [place] many years ago but he does not have associated anterior translation on exam. We will have him follow up once the injections are approved.     Patient will benefit from the use of a lateral offloader brace, which maximizes stability and support for knee osteoarthritis. The brace will assist in correcting alignment, providing stability to the knee, and decreasing the chance of injury from falling.    1/23/23:  42 y.o. male with bilateral knee osteoarthritis primarily affecting the lateral joint line.  He does have medial and lateral joint line in the right knee but only lateral joint line pain  on the left knee.   I discussed with the patient the natural history of arthritis and its progressive nature.  We discussed conservative treatment options to include ice, rest, compression, elevation, physical

## 2025-05-30 ENCOUNTER — OFFICE VISIT (OUTPATIENT)
Age: 45
End: 2025-05-30

## 2025-05-30 VITALS — BODY MASS INDEX: 35.53 KG/M2 | WEIGHT: 262 LBS

## 2025-05-30 DIAGNOSIS — M17.11 PRIMARY OSTEOARTHRITIS OF RIGHT KNEE: Primary | ICD-10-CM

## 2025-05-30 RX ORDER — LIDOCAINE HYDROCHLORIDE 10 MG/ML
2 INJECTION, SOLUTION INFILTRATION; PERINEURAL ONCE
Status: COMPLETED | OUTPATIENT
Start: 2025-05-30 | End: 2025-05-30

## 2025-05-30 RX ADMIN — LIDOCAINE HYDROCHLORIDE 2 ML: 10 INJECTION, SOLUTION INFILTRATION; PERINEURAL at 11:59

## 2025-05-30 NOTE — PROGRESS NOTES
Patient: Kris Chauhan                MRN: [id number]        SSN: [id number]   YOB: 1980        AGE:  42 y.o.        SEX: male   Body mass index is 37.3 kg/m².      PCP: Jeanne Patricia MD  5/30/25      Chief Complaint: Knee Pain (Right knee)       1. Primary osteoarthritis of right knee  Assessment & Plan:  After explaining potential risk of infection, skin discoloration, hyperglycemia, or flushing, I obtained written consent the patient would like to move forward with a right knee intra-articular injection the patient was prepped in normal sterile fashion with freeze spray and alcohol.  2mL Euflexxa (1% sodium Hyaluronate) was injected .  The needle was withdrawn, the area was cleansed and a sterile bandage was applied.  The patient tolerated the procedure well.         Orders:  -     DRAIN/INJECT LARGE JOINT/BURSA  -     lidocaine 1 % injection 2 mL; 2 mL, Intra-artICUlar, ONCE, 1 dose, On Fri 5/30/25 at 1030  -     sodium hyaluronate (EUFLEXXA, HYALGAN) injection 20 mg; 20 mg, Intra-artICUlar, ONCE, 1 dose, On Fri 5/30/25 at 1030Which brand are you ordering? Euflexxa           HPI: Kris Chauhan is a 42 y.o. male patient with   Chief Complaint   Patient presents with    Knee Pain     Right knee     Has been having knee pain for many years.  He was previously incarcerated and injured his right knee while playing basketball.  He is an avid athlete growing up and has always had trouble with his knees.  He also reports he is always had \"knock knees\". He reports that his pain has gotten increasingly worse in his right knee. He was previously given an order for PT which he did for 2 months with little relief. He has to stop secondary to the cost but reports doing his exercises at home. He denies any new injuries to his knee.        PHYSICAL EXAMINATION:   GENERAL: Alert and oriented x3, in no acute distress.   HEENT: Normocephalic, atraumatic.     MSK: Right Knee Exam

## 2025-07-18 DIAGNOSIS — R12 HEARTBURN: ICD-10-CM

## 2025-07-21 RX ORDER — OMEPRAZOLE 40 MG/1
40 CAPSULE, DELAYED RELEASE ORAL DAILY
Qty: 30 CAPSULE | Refills: 0 | Status: SHIPPED | OUTPATIENT
Start: 2025-07-21

## 2025-07-21 NOTE — TELEPHONE ENCOUNTER
Medication(s) requesting:   Requested Prescriptions     Pending Prescriptions Disp Refills    omeprazole (PRILOSEC) 40 MG delayed release capsule [Pharmacy Med Name: OMEPRAZOLE DR 40MG  CAP] 30 capsule 0     Sig: Take 1 capsule by mouth once daily       Last office visit:  02/19/2025  Next office visit DMA: Visit date not found